# Patient Record
Sex: MALE | Race: OTHER | Employment: OTHER | URBAN - METROPOLITAN AREA
[De-identification: names, ages, dates, MRNs, and addresses within clinical notes are randomized per-mention and may not be internally consistent; named-entity substitution may affect disease eponyms.]

---

## 2017-05-20 ENCOUNTER — APPOINTMENT (OUTPATIENT)
Dept: CT IMAGING | Facility: HOSPITAL | Age: 67
DRG: 346 | End: 2017-05-20
Attending: EMERGENCY MEDICINE
Payer: MEDICAID

## 2017-05-20 ENCOUNTER — HOSPITAL ENCOUNTER (INPATIENT)
Facility: HOSPITAL | Age: 67
LOS: 3 days | Discharge: HOME | DRG: 346 | End: 2017-05-23
Attending: EMERGENCY MEDICINE | Admitting: INTERNAL MEDICINE
Payer: MEDICAID

## 2017-05-20 DIAGNOSIS — K61.1 PERIRECTAL ABSCESS: Primary | ICD-10-CM

## 2017-05-20 PROCEDURE — 96375 TX/PRO/DX INJ NEW DRUG ADDON: CPT

## 2017-05-20 PROCEDURE — 85730 THROMBOPLASTIN TIME PARTIAL: CPT | Performed by: SPECIALIST

## 2017-05-20 PROCEDURE — 85610 PROTHROMBIN TIME: CPT | Performed by: SPECIALIST

## 2017-05-20 PROCEDURE — 80048 BASIC METABOLIC PNL TOTAL CA: CPT | Performed by: EMERGENCY MEDICINE

## 2017-05-20 PROCEDURE — 85025 COMPLETE CBC W/AUTO DIFF WBC: CPT | Performed by: EMERGENCY MEDICINE

## 2017-05-20 PROCEDURE — 99285 EMERGENCY DEPT VISIT HI MDM: CPT

## 2017-05-20 PROCEDURE — 72192 CT PELVIS W/O DYE: CPT | Performed by: EMERGENCY MEDICINE

## 2017-05-20 PROCEDURE — 72193 CT PELVIS W/DYE: CPT | Performed by: EMERGENCY MEDICINE

## 2017-05-20 RX ORDER — MORPHINE SULFATE 4 MG/ML
4 INJECTION, SOLUTION INTRAMUSCULAR; INTRAVENOUS ONCE
Status: COMPLETED | OUTPATIENT
Start: 2017-05-20 | End: 2017-05-20

## 2017-05-20 RX ORDER — MORPHINE SULFATE 2 MG/ML
2 INJECTION, SOLUTION INTRAMUSCULAR; INTRAVENOUS EVERY 4 HOURS PRN
Status: DISCONTINUED | OUTPATIENT
Start: 2017-05-20 | End: 2017-05-23

## 2017-05-20 RX ORDER — METRONIDAZOLE 500 MG/100ML
500 INJECTION, SOLUTION INTRAVENOUS EVERY 8 HOURS
Status: DISCONTINUED | OUTPATIENT
Start: 2017-05-20 | End: 2017-05-23

## 2017-05-20 RX ORDER — MORPHINE SULFATE 2 MG/ML
1 INJECTION, SOLUTION INTRAMUSCULAR; INTRAVENOUS EVERY 4 HOURS PRN
Status: DISCONTINUED | OUTPATIENT
Start: 2017-05-20 | End: 2017-05-23

## 2017-05-20 RX ORDER — HYDROMORPHONE HYDROCHLORIDE 1 MG/ML
0.5 INJECTION, SOLUTION INTRAMUSCULAR; INTRAVENOUS; SUBCUTANEOUS EVERY 2 HOUR PRN
Status: ACTIVE | OUTPATIENT
Start: 2017-05-20 | End: 2017-05-20

## 2017-05-20 RX ORDER — SODIUM CHLORIDE 9 MG/ML
INJECTION, SOLUTION INTRAVENOUS CONTINUOUS
Status: DISPENSED | OUTPATIENT
Start: 2017-05-20 | End: 2017-05-20

## 2017-05-21 ENCOUNTER — ANESTHESIA (OUTPATIENT)
Dept: SURGERY | Facility: HOSPITAL | Age: 67
DRG: 346 | End: 2017-05-21
Payer: MEDICAID

## 2017-05-21 ENCOUNTER — SURGERY (OUTPATIENT)
Age: 67
End: 2017-05-21

## 2017-05-21 ENCOUNTER — ANESTHESIA EVENT (OUTPATIENT)
Dept: SURGERY | Facility: HOSPITAL | Age: 67
DRG: 346 | End: 2017-05-21
Payer: MEDICAID

## 2017-05-21 PROCEDURE — 0JD90ZZ EXTRACTION OF BUTTOCK SUBCUTANEOUS TISSUE AND FASCIA, OPEN APPROACH: ICD-10-PCS | Performed by: SPECIALIST

## 2017-05-21 PROCEDURE — 87077 CULTURE AEROBIC IDENTIFY: CPT | Performed by: SPECIALIST

## 2017-05-21 PROCEDURE — 87075 CULTR BACTERIA EXCEPT BLOOD: CPT | Performed by: SPECIALIST

## 2017-05-21 PROCEDURE — 87070 CULTURE OTHR SPECIMN AEROBIC: CPT | Performed by: SPECIALIST

## 2017-05-21 PROCEDURE — 87186 SC STD MICRODIL/AGAR DIL: CPT | Performed by: SPECIALIST

## 2017-05-21 PROCEDURE — 87076 CULTURE ANAEROBE IDENT EACH: CPT | Performed by: SPECIALIST

## 2017-05-21 PROCEDURE — 93005 ELECTROCARDIOGRAM TRACING: CPT

## 2017-05-21 PROCEDURE — 80048 BASIC METABOLIC PNL TOTAL CA: CPT | Performed by: INTERNAL MEDICINE

## 2017-05-21 PROCEDURE — 85025 COMPLETE CBC W/AUTO DIFF WBC: CPT | Performed by: INTERNAL MEDICINE

## 2017-05-21 PROCEDURE — 87205 SMEAR GRAM STAIN: CPT | Performed by: SPECIALIST

## 2017-05-21 PROCEDURE — 93010 ELECTROCARDIOGRAM REPORT: CPT | Performed by: SPECIALIST

## 2017-05-21 PROCEDURE — 85007 BL SMEAR W/DIFF WBC COUNT: CPT | Performed by: INTERNAL MEDICINE

## 2017-05-21 PROCEDURE — 85027 COMPLETE CBC AUTOMATED: CPT | Performed by: INTERNAL MEDICINE

## 2017-05-21 PROCEDURE — 0D9P0ZX DRAINAGE OF RECTUM, OPEN APPROACH, DIAGNOSTIC: ICD-10-PCS | Performed by: SPECIALIST

## 2017-05-21 RX ORDER — NEOSTIGMINE METHYLSULFATE 0.5 MG/ML
INJECTION INTRAVENOUS AS NEEDED
Status: DISCONTINUED | OUTPATIENT
Start: 2017-05-21 | End: 2017-05-21 | Stop reason: SURG

## 2017-05-21 RX ORDER — HYDROMORPHONE HYDROCHLORIDE 1 MG/ML
0.2 INJECTION, SOLUTION INTRAMUSCULAR; INTRAVENOUS; SUBCUTANEOUS EVERY 5 MIN PRN
Status: DISCONTINUED | OUTPATIENT
Start: 2017-05-21 | End: 2017-05-21 | Stop reason: HOSPADM

## 2017-05-21 RX ORDER — ENOXAPARIN SODIUM 100 MG/ML
40 INJECTION SUBCUTANEOUS DAILY
Status: DISCONTINUED | OUTPATIENT
Start: 2017-05-21 | End: 2017-05-23

## 2017-05-21 RX ORDER — HYDROCODONE BITARTRATE AND ACETAMINOPHEN 10; 325 MG/1; MG/1
1 TABLET ORAL EVERY 4 HOURS PRN
Status: DISCONTINUED | OUTPATIENT
Start: 2017-05-21 | End: 2017-05-23

## 2017-05-21 RX ORDER — SODIUM CHLORIDE 9 MG/ML
INJECTION, SOLUTION INTRAVENOUS CONTINUOUS PRN
Status: DISCONTINUED | OUTPATIENT
Start: 2017-05-21 | End: 2017-05-21 | Stop reason: SURG

## 2017-05-21 RX ORDER — HYDROCODONE BITARTRATE AND ACETAMINOPHEN 5; 325 MG/1; MG/1
2 TABLET ORAL AS NEEDED
Status: DISCONTINUED | OUTPATIENT
Start: 2017-05-21 | End: 2017-05-21 | Stop reason: HOSPADM

## 2017-05-21 RX ORDER — HYDROCODONE BITARTRATE AND ACETAMINOPHEN 10; 325 MG/1; MG/1
2 TABLET ORAL EVERY 4 HOURS PRN
Status: DISCONTINUED | OUTPATIENT
Start: 2017-05-21 | End: 2017-05-23

## 2017-05-21 RX ORDER — MORPHINE SULFATE 2 MG/ML
2 INJECTION, SOLUTION INTRAMUSCULAR; INTRAVENOUS EVERY 2 HOUR PRN
Status: DISCONTINUED | OUTPATIENT
Start: 2017-05-21 | End: 2017-05-23

## 2017-05-21 RX ORDER — SODIUM CHLORIDE 9 MG/ML
INJECTION, SOLUTION INTRAVENOUS
Status: COMPLETED
Start: 2017-05-21 | End: 2017-05-21

## 2017-05-21 RX ORDER — HALOPERIDOL 5 MG/ML
0.25 INJECTION INTRAMUSCULAR ONCE AS NEEDED
Status: DISCONTINUED | OUTPATIENT
Start: 2017-05-21 | End: 2017-05-21 | Stop reason: HOSPADM

## 2017-05-21 RX ORDER — SODIUM CHLORIDE 0.9 % (FLUSH) 0.9 %
10 SYRINGE (ML) INJECTION AS NEEDED
Status: DISCONTINUED | OUTPATIENT
Start: 2017-05-21 | End: 2017-05-23

## 2017-05-21 RX ORDER — ROCURONIUM BROMIDE 10 MG/ML
INJECTION, SOLUTION INTRAVENOUS AS NEEDED
Status: DISCONTINUED | OUTPATIENT
Start: 2017-05-21 | End: 2017-05-21 | Stop reason: SURG

## 2017-05-21 RX ORDER — HYDROCODONE BITARTRATE AND ACETAMINOPHEN 5; 325 MG/1; MG/1
1 TABLET ORAL AS NEEDED
Status: DISCONTINUED | OUTPATIENT
Start: 2017-05-21 | End: 2017-05-21 | Stop reason: HOSPADM

## 2017-05-21 RX ORDER — GLYCOPYRROLATE 0.2 MG/ML
INJECTION INTRAMUSCULAR; INTRAVENOUS AS NEEDED
Status: DISCONTINUED | OUTPATIENT
Start: 2017-05-21 | End: 2017-05-21 | Stop reason: SURG

## 2017-05-21 RX ORDER — MORPHINE SULFATE 4 MG/ML
4 INJECTION, SOLUTION INTRAMUSCULAR; INTRAVENOUS EVERY 2 HOUR PRN
Status: DISCONTINUED | OUTPATIENT
Start: 2017-05-21 | End: 2017-05-23

## 2017-05-21 RX ORDER — PHENYLEPHRINE HCL 10 MG/ML
VIAL (ML) INJECTION AS NEEDED
Status: DISCONTINUED | OUTPATIENT
Start: 2017-05-21 | End: 2017-05-21 | Stop reason: SURG

## 2017-05-21 RX ORDER — ONDANSETRON 2 MG/ML
4 INJECTION INTRAMUSCULAR; INTRAVENOUS ONCE AS NEEDED
Status: DISCONTINUED | OUTPATIENT
Start: 2017-05-21 | End: 2017-05-21 | Stop reason: HOSPADM

## 2017-05-21 RX ORDER — SODIUM CHLORIDE, SODIUM LACTATE, POTASSIUM CHLORIDE, CALCIUM CHLORIDE 600; 310; 30; 20 MG/100ML; MG/100ML; MG/100ML; MG/100ML
INJECTION, SOLUTION INTRAVENOUS CONTINUOUS
Status: DISCONTINUED | OUTPATIENT
Start: 2017-05-21 | End: 2017-05-21

## 2017-05-21 RX ORDER — MORPHINE SULFATE 2 MG/ML
1 INJECTION, SOLUTION INTRAMUSCULAR; INTRAVENOUS EVERY 2 HOUR PRN
Status: DISCONTINUED | OUTPATIENT
Start: 2017-05-21 | End: 2017-05-23

## 2017-05-21 RX ORDER — ACETAMINOPHEN 325 MG/1
650 TABLET ORAL EVERY 4 HOURS PRN
Status: DISCONTINUED | OUTPATIENT
Start: 2017-05-21 | End: 2017-05-23

## 2017-05-21 RX ADMIN — SODIUM CHLORIDE: 9 INJECTION, SOLUTION INTRAVENOUS at 14:55:00

## 2017-05-21 RX ADMIN — NEOSTIGMINE METHYLSULFATE 1 MG: 0.5 INJECTION INTRAVENOUS at 14:27:00

## 2017-05-21 RX ADMIN — ROCURONIUM BROMIDE 30 MG: 10 INJECTION, SOLUTION INTRAVENOUS at 13:50:00

## 2017-05-21 RX ADMIN — PHENYLEPHRINE HCL 300 MCG: 10 MG/ML VIAL (ML) INJECTION at 14:24:00

## 2017-05-21 RX ADMIN — GLYCOPYRROLATE 0.2 MG: 0.2 INJECTION INTRAMUSCULAR; INTRAVENOUS at 14:27:00

## 2017-05-21 RX ADMIN — SODIUM CHLORIDE: 9 INJECTION, SOLUTION INTRAVENOUS at 14:25:00

## 2017-05-21 RX ADMIN — NEOSTIGMINE METHYLSULFATE 1 MG: 0.5 INJECTION INTRAVENOUS at 14:44:00

## 2017-05-21 RX ADMIN — SODIUM CHLORIDE: 9 INJECTION, SOLUTION INTRAVENOUS at 13:30:00

## 2017-05-21 RX ADMIN — SODIUM CHLORIDE: 9 INJECTION, SOLUTION INTRAVENOUS at 13:34:00

## 2017-05-21 RX ADMIN — GLYCOPYRROLATE 0.2 MG: 0.2 INJECTION INTRAMUSCULAR; INTRAVENOUS at 14:44:00

## 2017-05-21 NOTE — PROGRESS NOTES
[facility]    Progress Note    Ramandeep Cobian Patient Status:  Inpatient    1950 MRN [id number]   Location [facility] 4W/SW/SE Attending Armond Francisco MD   Hosp Day # 1 PCP None Pcp     Subjective:     Constitutional:

## 2017-05-21 NOTE — ANESTHESIA PREPROCEDURE EVALUATION
Anesthesia PreOp Note    HPI:     Lizzeth Young is a 78year old male who presents for preoperative consultation requested by: Anthony Bowers MD    Date of Surgery: 5/20/2017 - 5/21/2017    Procedure(s):  ANAL ABSCESS IRRIGATION & DEBRIDEMENT  Miesha Delgado Intravenous PRN Anh Helm MD 1 mg at 05/21/17 1444    glycopyrrolate (ROBINUL) 0.2 MG/ML injection  Intravenous PRN Anh Helm MD 0.2 mg at 05/21/17 1444    Rocuronium Bromide (ZEMURON) injection  Intravenous PRN Anh Helm MD 30 mg a Lab Results  Component Value Date   INR 1.3* 05/20/2017       Vital Signs: Body mass index is 28.33 kg/(m^2). height is 1.727 m (5' 7.99\") and weight is 84.505 kg (186 lb 4.8 oz). His oral temperature is 97 °F (36.1 °C).  His blood pressure is 91/

## 2017-05-21 NOTE — PLAN OF CARE
Consent obtained over the phone using the translation line. Kings Nascimento #[id number] assisted with the signature. Daughter Bladimir Cheatham also called and assisted patient with signing the consent. Plan for I&D of perirectal abscess today with Dr. Carlie Whaley. Patient remains NPO.

## 2017-05-21 NOTE — H&P
[facility]    History and Physical    [facility] Patient Status:  Inpatient    1950 MRN [id number]   Location [facility] 4W/SW/SE Attending Cady Fox MD   [facility] Day # 0 PCP None Pcp     Date:  2017  Date of Results  Component Value Date   WBC 18.5* 05/20/2017   HGB 14.1 05/20/2017   HCT 43.3 05/20/2017    05/20/2017   CREATSERUM 0.66 05/20/2017   BUN 2* 05/20/2017   * 05/20/2017   K 3.3 05/20/2017    05/20/2017   CO2 22 05/20/2017

## 2017-05-21 NOTE — PLAN OF CARE
Altered Communication/Language Barrier    • Patient/Family is able to understand and participate in their care   Progressing        DISCHARGE PLANNING    • Discharge to home or other facility with appropriate resources Progressing        PAIN - ADULT    •

## 2017-05-21 NOTE — CONSULTS
[facility] HOSP - [facility]    Report of Consultation    Lizzeth Hector Patient Status:  Inpatient    1950 MRN [id number]   Location [place] [address] Attending Leonor Lewis MD   [facility] Day # 1 PCP None Pcp     Date of Admissio examination is grossly normal.  No focal neurologic deficit.     Laboratory Data:    Lab Results  Component Value Date   WBC 18.1* 05/21/2017   HGB 14.4 05/21/2017   HCT 43.9 05/21/2017    05/21/2017   CREATSERUM 0.72 05/21/2017   BUN 4* 05/21/2017 Moderate-sized perirectal abscess as discussed above. See above discussion. Surgical intervention is advised.            Impression and Plan:  Patient Active Problem List:     Perirectal abscess  for I and D  And debridement     Doubt involving rectum

## 2017-05-21 NOTE — CONSULTS
[facility]    Report of Consultation    Edy Javed Patient Status:  Inpatient    1950 MRN [id number]   Location [facility] 4W/SW/SE Attending Ignacia Gaston MD   [address] Day # 1 PCP None Pcp     Date of Admission:    05/21/2017   CREATSERUM 0.72 05/21/2017   BUN 4* 05/21/2017    05/21/2017   K 3.9 05/21/2017    05/21/2017   CO2 27 05/21/2017   * 05/21/2017   CA 8.2* 05/21/2017   PTT 27.7 05/20/2017   INR 1.3* 05/20/2017   PTP 15.6* 05/20/20

## 2017-05-21 NOTE — BRIEF OP NOTE
Pre-Operative Diagnosis: Perirectal abscess [K61.1]     Post-Operative Diagnosis: same     Procedure Performed:   Procedure(s):  INCISION AND DRAINAGE AND DEBRIDEMENT OF PERIRECTAL ABSCESS    Surgeon(s) and Role:     Sally Wilson MD - Primary

## 2017-05-21 NOTE — ED PROVIDER NOTES
Patient Seen in: [facility] AND [facility] Emergency Department    History   Patient presents with:  Abscess (integumentary)    Stated Complaint: c/o left buttock abscess    HPI    The patient is a 39-year-old male who presents with 4 days of gradually increasin Abdominal: Soft. Bowel sounds are normal. He exhibits no distension and no mass. There is no tenderness. There is no rebound and no guarding.    Genitourinary: Rectal exam shows tenderness (Point tenderness without palpable fluctuance mass or overlying warm Radiology exams  Viewed and reviewed by myself and findings discussed with patient including need for follow up    Case discussed with Dr. Roxana García and Dr Kiana Garcia with Id dr Willie Arcos on consult.   Iv zosyn given and patient will be admitted for IV antibiotics

## 2017-05-21 NOTE — ANESTHESIA POSTPROCEDURE EVALUATION
Patient: Екатерина [place]    Procedure Summary     Date Anesthesia Start Anesthesia Stop Room / Location    05/21/17 1335  [facility] MAIN OR 05 / 300 [facility] MAIN OR       Procedure Diagnosis Surgeon Responsible Provider    ANAL ABSCESS IRRIGATION & DEBRIDEMENT (N/A ) Pe

## 2017-05-22 PROCEDURE — 85025 COMPLETE CBC W/AUTO DIFF WBC: CPT | Performed by: SPECIALIST

## 2017-05-22 NOTE — PROGRESS NOTES
INFECTIOUS DISEASE PROGRESS NOTE    Doc Kan Patient Status:  Inpatient    1950 MRN [id number]   Location [facility] 4W/SW/SE Attending Analy Carrera MD   Hosp Day # 2 PCP None Pcp     Subjective:  Patient seen and examined, note

## 2017-05-22 NOTE — OPERATIVE REPORT
[facility]    PATIENT'S NAME: Regine Mccann   ATTENDING PHYSICIAN: Kathleen Man MD   OPERATING PHYSICIAN: Gosia Hood MD   PATIENT ACCOUNT#:   [id number]    LOCATION:  [address] [place] [address] #:   [id number]       DATE OF BERNADINE

## 2017-05-22 NOTE — PROGRESS NOTES
[facility]    Progress Note    Angel Martin Patient Status:  Inpatient    1950 MRN [id number]   Location [facility] 4W/SW/SE Attending Padma Shabazz MD   [facility] Day # 2 PCP None Pcp     Subjective:  Feels ok      Less located posterior and inferior to the rectum with fluid density and peripheral enhancement as well as some air density compatible with a moderate-sized perirectal abscess. This measures 2.9 x 4.6 x 5.5 cm in size.  Air within the abscess may suggest either

## 2017-05-22 NOTE — PROGRESS NOTES
[facility]    Progress Note    Eb Mathew Patient Status:  Inpatient    1950 MRN [id number]   Location [facility] 4W/SW/SE Attending Stephane Petty MD   Hosp Day # 2 PCP None Pcp     Subjective:     Constitutional:

## 2017-05-23 VITALS
DIASTOLIC BLOOD PRESSURE: 67 MMHG | SYSTOLIC BLOOD PRESSURE: 114 MMHG | HEIGHT: 67.99 IN | WEIGHT: 186.31 LBS | RESPIRATION RATE: 20 BRPM | BODY MASS INDEX: 28.24 KG/M2 | HEART RATE: 100 BPM | TEMPERATURE: 99 F | OXYGEN SATURATION: 97 %

## 2017-05-23 RX ORDER — AMOXICILLIN AND CLAVULANATE POTASSIUM 875; 125 MG/1; MG/1
1 TABLET, FILM COATED ORAL 2 TIMES DAILY
Qty: 20 TABLET | Refills: 0 | Status: ON HOLD | OUTPATIENT
Start: 2017-05-23 | End: 2018-01-01

## 2017-05-23 RX ORDER — 0.9 % SODIUM CHLORIDE 0.9 %
VIAL (ML) INJECTION
Status: COMPLETED
Start: 2017-05-23 | End: 2017-05-23

## 2017-05-23 RX ORDER — SODIUM CHLORIDE 9 MG/ML
INJECTION, SOLUTION INTRAVENOUS
Status: COMPLETED
Start: 2017-05-23 | End: 2017-05-23

## 2017-05-23 RX ORDER — CIPROFLOXACIN 500 MG/1
500 TABLET, FILM COATED ORAL 2 TIMES DAILY
Qty: 20 TABLET | Refills: 0 | Status: ON HOLD | OUTPATIENT
Start: 2017-05-23 | End: 2018-01-01

## 2017-05-23 NOTE — PROGRESS NOTES
INFECTIOUS DISEASE PROGRESS NOTE    Zuri Sullivan Patient Status:  Inpatient    1950 MRN [id number]   Location [facility] 4W/SW/SE Attending Elvia Nunn MD   Hosp Day # 3 PCP None Pcp     Subjective:  Patient seen and examined, note

## 2017-05-23 NOTE — DISCHARGE PLANNING
ELOISE was notified the pt. Will be needing IV abx at discharge. [facility] is covered 100% for at home delivery. Referral has been made to [facility] to see if they are able to staff the nursing services. Waiting on return call.       4pm- ELOISE was notified t

## 2017-05-23 NOTE — PLAN OF CARE
Altered Communication/Language Barrier    • Patient/Family is able to understand and participate in their care Adequate for Discharge        DISCHARGE PLANNING    • Discharge to home or other facility with appropriate resources Adequate for Discharge

## 2017-05-23 NOTE — PROGRESS NOTES
[facility]    Progress Note    Lizzeth Young Patient Status:  Inpatient    1950 MRN [id number]   Location [facility] 4W/SW/SE Attending Leonor Lewis MD   Hosp Day # 3 PCP None Pcp     Subjective:  Feels ok    Object peripheral enhancement as well as some air density compatible with a moderate-sized perirectal abscess. This measures 2.9 x 4.6 x 5.5 cm in size.  Air within the abscess may suggest either direct communication with the rectum or be secondary to gas-forming

## 2017-05-23 NOTE — DISCHARGE SUMMARY
[facility] - [facility]    Discharge Summary    Nirali De Guzman Patient Status:  Inpatient    1950 MRN [id number]   Location [facility] 4W/SW/SE Attending Malinda Carey MD   [facility] Day # 3 PCP None Pcp     Date of Admission: 2017

## 2017-06-07 ENCOUNTER — LAB ENCOUNTER (OUTPATIENT)
Dept: LAB | Facility: HOSPITAL | Age: 67
End: 2017-06-07
Attending: INTERNAL MEDICINE
Payer: MEDICAID

## 2017-06-07 DIAGNOSIS — Z00.00 WELL ADULT EXAM: ICD-10-CM

## 2017-06-07 DIAGNOSIS — E78.5 HYPERLIPIDEMIA: Primary | ICD-10-CM

## 2017-06-07 PROCEDURE — 84443 ASSAY THYROID STIM HORMONE: CPT

## 2017-06-07 PROCEDURE — 80053 COMPREHEN METABOLIC PANEL: CPT

## 2017-06-07 PROCEDURE — 85025 COMPLETE CBC W/AUTO DIFF WBC: CPT

## 2017-06-07 PROCEDURE — 84153 ASSAY OF PSA TOTAL: CPT

## 2017-06-07 PROCEDURE — 80061 LIPID PANEL: CPT

## 2017-06-07 PROCEDURE — 36415 COLL VENOUS BLD VENIPUNCTURE: CPT

## 2018-01-01 ENCOUNTER — NURSE ONLY (OUTPATIENT)
Dept: HEMATOLOGY/ONCOLOGY | Facility: HOSPITAL | Age: 68
End: 2018-01-01
Attending: INTERNAL MEDICINE
Payer: MEDICAID

## 2018-01-01 ENCOUNTER — TELEPHONE (OUTPATIENT)
Dept: HEMATOLOGY/ONCOLOGY | Facility: HOSPITAL | Age: 68
End: 2018-01-01

## 2018-01-01 ENCOUNTER — TELEPHONE (OUTPATIENT)
Dept: GASTROENTEROLOGY | Facility: CLINIC | Age: 68
End: 2018-01-01

## 2018-01-01 ENCOUNTER — SURGERY (OUTPATIENT)
Age: 68
End: 2018-01-01

## 2018-01-01 ENCOUNTER — OFFICE VISIT (OUTPATIENT)
Dept: HEMATOLOGY/ONCOLOGY | Facility: HOSPITAL | Age: 68
End: 2018-01-01
Attending: INTERNAL MEDICINE
Payer: MEDICARE

## 2018-01-01 ENCOUNTER — APPOINTMENT (OUTPATIENT)
Dept: ULTRASOUND IMAGING | Facility: HOSPITAL | Age: 68
DRG: 981 | End: 2018-01-01
Attending: HOSPITALIST
Payer: MEDICAID

## 2018-01-01 ENCOUNTER — HOSPITAL ENCOUNTER (OUTPATIENT)
Dept: ULTRASOUND IMAGING | Facility: HOSPITAL | Age: 68
Discharge: HOME | End: 2018-01-01
Attending: INTERNAL MEDICINE
Payer: MEDICAID

## 2018-01-01 ENCOUNTER — HOSPITAL ENCOUNTER (OUTPATIENT)
Dept: CT IMAGING | Age: 68
Discharge: HOME | End: 2018-01-01
Attending: INTERNAL MEDICINE
Payer: MEDICAID

## 2018-01-01 ENCOUNTER — HOSPITAL ENCOUNTER (OUTPATIENT)
Dept: CT IMAGING | Facility: HOSPITAL | Age: 68
Discharge: HOME | End: 2018-01-01
Attending: PHYSICIAN ASSISTANT
Payer: MEDICAID

## 2018-01-01 ENCOUNTER — HOSPITAL ENCOUNTER (OUTPATIENT)
Dept: ULTRASOUND IMAGING | Facility: HOSPITAL | Age: 68
Discharge: HOME | End: 2018-01-01
Attending: PHYSICIAN ASSISTANT
Payer: MEDICAID

## 2018-01-01 ENCOUNTER — APPOINTMENT (OUTPATIENT)
Dept: MRI IMAGING | Facility: HOSPITAL | Age: 68
DRG: 981 | End: 2018-01-01
Attending: HOSPITALIST
Payer: MEDICAID

## 2018-01-01 ENCOUNTER — HOSPITAL ENCOUNTER (OUTPATIENT)
Dept: ULTRASOUND IMAGING | Age: 68
Discharge: HOME | End: 2018-01-01
Attending: FAMILY MEDICINE
Payer: MEDICAID

## 2018-01-01 ENCOUNTER — APPOINTMENT (OUTPATIENT)
Dept: MRI IMAGING | Facility: HOSPITAL | Age: 68
DRG: 981 | End: 2018-01-01
Attending: SPECIALIST
Payer: MEDICAID

## 2018-01-01 ENCOUNTER — OFFICE VISIT (OUTPATIENT)
Dept: GASTROENTEROLOGY | Facility: CLINIC | Age: 68
End: 2018-01-01

## 2018-01-01 ENCOUNTER — OFFICE VISIT (OUTPATIENT)
Dept: HEMATOLOGY/ONCOLOGY | Facility: HOSPITAL | Age: 68
End: 2018-01-01
Attending: PHYSICIAN ASSISTANT
Payer: MEDICAID

## 2018-01-01 ENCOUNTER — HOSPITAL ENCOUNTER (INPATIENT)
Facility: HOSPITAL | Age: 68
LOS: 4 days | Discharge: HOME | DRG: 981 | End: 2018-01-01
Attending: EMERGENCY MEDICINE | Admitting: HOSPITALIST
Payer: MEDICAID

## 2018-01-01 ENCOUNTER — HOSPITAL ENCOUNTER (OUTPATIENT)
Dept: CT IMAGING | Facility: HOSPITAL | Age: 68
Discharge: HOME | End: 2018-01-01
Attending: INTERNAL MEDICINE
Payer: MEDICAID

## 2018-01-01 ENCOUNTER — OFFICE VISIT (OUTPATIENT)
Dept: SURGERY | Facility: CLINIC | Age: 68
End: 2018-01-01
Payer: MEDICAID

## 2018-01-01 ENCOUNTER — HOSPITAL ENCOUNTER (OUTPATIENT)
Age: 68
Discharge: HOME | End: 2018-01-01
Attending: FAMILY MEDICINE
Payer: MEDICAID

## 2018-01-01 ENCOUNTER — OFFICE VISIT (OUTPATIENT)
Dept: HEMATOLOGY/ONCOLOGY | Facility: HOSPITAL | Age: 68
End: 2018-01-01
Attending: NURSE PRACTITIONER
Payer: MEDICAID

## 2018-01-01 ENCOUNTER — LAB ENCOUNTER (OUTPATIENT)
Dept: LAB | Facility: HOSPITAL | Age: 68
End: 2018-01-01
Attending: PHYSICIAN ASSISTANT
Payer: MEDICAID

## 2018-01-01 ENCOUNTER — HOSPITAL ENCOUNTER (EMERGENCY)
Facility: HOSPITAL | Age: 68
Discharge: HOME | End: 2018-01-01
Attending: EMERGENCY MEDICINE
Payer: MEDICAID

## 2018-01-01 VITALS
TEMPERATURE: 98 F | WEIGHT: 158 LBS | SYSTOLIC BLOOD PRESSURE: 118 MMHG | DIASTOLIC BLOOD PRESSURE: 62 MMHG | RESPIRATION RATE: 16 BRPM | BODY MASS INDEX: 24 KG/M2 | HEART RATE: 103 BPM

## 2018-01-01 VITALS
SYSTOLIC BLOOD PRESSURE: 126 MMHG | HEART RATE: 105 BPM | RESPIRATION RATE: 16 BRPM | TEMPERATURE: 99 F | DIASTOLIC BLOOD PRESSURE: 60 MMHG

## 2018-01-01 VITALS
DIASTOLIC BLOOD PRESSURE: 104 MMHG | RESPIRATION RATE: 18 BRPM | HEART RATE: 112 BPM | WEIGHT: 166 LBS | BODY MASS INDEX: 25.16 KG/M2 | TEMPERATURE: 98 F | HEIGHT: 68 IN | SYSTOLIC BLOOD PRESSURE: 146 MMHG

## 2018-01-01 VITALS
BODY MASS INDEX: 26 KG/M2 | SYSTOLIC BLOOD PRESSURE: 139 MMHG | RESPIRATION RATE: 18 BRPM | DIASTOLIC BLOOD PRESSURE: 90 MMHG | TEMPERATURE: 98 F | HEART RATE: 113 BPM | WEIGHT: 170 LBS

## 2018-01-01 VITALS
HEART RATE: 94 BPM | HEIGHT: 68 IN | DIASTOLIC BLOOD PRESSURE: 63 MMHG | WEIGHT: 161 LBS | BODY MASS INDEX: 24.4 KG/M2 | TEMPERATURE: 98 F | SYSTOLIC BLOOD PRESSURE: 118 MMHG | RESPIRATION RATE: 18 BRPM

## 2018-01-01 VITALS
HEART RATE: 96 BPM | DIASTOLIC BLOOD PRESSURE: 70 MMHG | BODY MASS INDEX: 24.25 KG/M2 | TEMPERATURE: 99 F | SYSTOLIC BLOOD PRESSURE: 130 MMHG | WEIGHT: 160 LBS | HEIGHT: 68 IN | RESPIRATION RATE: 18 BRPM

## 2018-01-01 VITALS
SYSTOLIC BLOOD PRESSURE: 128 MMHG | DIASTOLIC BLOOD PRESSURE: 79 MMHG | HEART RATE: 92 BPM | RESPIRATION RATE: 16 BRPM | TEMPERATURE: 98 F

## 2018-01-01 VITALS
RESPIRATION RATE: 16 BRPM | SYSTOLIC BLOOD PRESSURE: 132 MMHG | HEIGHT: 68 IN | BODY MASS INDEX: 25.46 KG/M2 | TEMPERATURE: 98 F | WEIGHT: 168 LBS | DIASTOLIC BLOOD PRESSURE: 74 MMHG | HEART RATE: 99 BPM

## 2018-01-01 VITALS
RESPIRATION RATE: 16 BRPM | BODY MASS INDEX: 25.91 KG/M2 | TEMPERATURE: 98 F | WEIGHT: 171 LBS | SYSTOLIC BLOOD PRESSURE: 150 MMHG | HEART RATE: 109 BPM | DIASTOLIC BLOOD PRESSURE: 67 MMHG | HEIGHT: 68 IN

## 2018-01-01 VITALS
BODY MASS INDEX: 23.64 KG/M2 | DIASTOLIC BLOOD PRESSURE: 71 MMHG | TEMPERATURE: 98 F | HEART RATE: 89 BPM | SYSTOLIC BLOOD PRESSURE: 121 MMHG | WEIGHT: 156 LBS | HEIGHT: 68 IN | RESPIRATION RATE: 18 BRPM

## 2018-01-01 VITALS
HEART RATE: 99 BPM | HEIGHT: 68 IN | SYSTOLIC BLOOD PRESSURE: 121 MMHG | OXYGEN SATURATION: 100 % | WEIGHT: 166 LBS | DIASTOLIC BLOOD PRESSURE: 64 MMHG | RESPIRATION RATE: 16 BRPM | BODY MASS INDEX: 25.16 KG/M2

## 2018-01-01 VITALS
DIASTOLIC BLOOD PRESSURE: 63 MMHG | TEMPERATURE: 100 F | OXYGEN SATURATION: 99 % | BODY MASS INDEX: 23.79 KG/M2 | SYSTOLIC BLOOD PRESSURE: 124 MMHG | WEIGHT: 157 LBS | HEIGHT: 68 IN | RESPIRATION RATE: 18 BRPM | DIASTOLIC BLOOD PRESSURE: 80 MMHG | HEART RATE: 102 BPM | BODY MASS INDEX: 24 KG/M2 | TEMPERATURE: 98 F | WEIGHT: 161 LBS | RESPIRATION RATE: 18 BRPM | HEART RATE: 94 BPM | SYSTOLIC BLOOD PRESSURE: 118 MMHG

## 2018-01-01 VITALS
HEART RATE: 86 BPM | SYSTOLIC BLOOD PRESSURE: 129 MMHG | WEIGHT: 170 LBS | BODY MASS INDEX: 25.76 KG/M2 | DIASTOLIC BLOOD PRESSURE: 64 MMHG | RESPIRATION RATE: 16 BRPM | OXYGEN SATURATION: 99 % | HEIGHT: 68 IN

## 2018-01-01 VITALS
HEART RATE: 80 BPM | RESPIRATION RATE: 16 BRPM | DIASTOLIC BLOOD PRESSURE: 78 MMHG | SYSTOLIC BLOOD PRESSURE: 133 MMHG | HEIGHT: 68 IN | OXYGEN SATURATION: 100 % | WEIGHT: 166 LBS | TEMPERATURE: 98 F | BODY MASS INDEX: 25.16 KG/M2

## 2018-01-01 VITALS
HEART RATE: 97 BPM | RESPIRATION RATE: 16 BRPM | SYSTOLIC BLOOD PRESSURE: 115 MMHG | TEMPERATURE: 98 F | BODY MASS INDEX: 24 KG/M2 | DIASTOLIC BLOOD PRESSURE: 68 MMHG | WEIGHT: 160 LBS

## 2018-01-01 VITALS
RESPIRATION RATE: 16 BRPM | WEIGHT: 158 LBS | TEMPERATURE: 98 F | BODY MASS INDEX: 23.95 KG/M2 | HEART RATE: 103 BPM | SYSTOLIC BLOOD PRESSURE: 118 MMHG | DIASTOLIC BLOOD PRESSURE: 62 MMHG | HEIGHT: 68 IN

## 2018-01-01 VITALS
RESPIRATION RATE: 18 BRPM | BODY MASS INDEX: 25.16 KG/M2 | SYSTOLIC BLOOD PRESSURE: 147 MMHG | HEART RATE: 119 BPM | DIASTOLIC BLOOD PRESSURE: 79 MMHG | TEMPERATURE: 99 F | WEIGHT: 166 LBS | HEIGHT: 68 IN

## 2018-01-01 VITALS
TEMPERATURE: 98 F | SYSTOLIC BLOOD PRESSURE: 128 MMHG | RESPIRATION RATE: 16 BRPM | HEART RATE: 92 BPM | DIASTOLIC BLOOD PRESSURE: 79 MMHG

## 2018-01-01 VITALS
HEIGHT: 68 IN | BODY MASS INDEX: 24.25 KG/M2 | TEMPERATURE: 98 F | WEIGHT: 160 LBS | SYSTOLIC BLOOD PRESSURE: 115 MMHG | DIASTOLIC BLOOD PRESSURE: 68 MMHG | HEART RATE: 97 BPM | RESPIRATION RATE: 16 BRPM

## 2018-01-01 VITALS
DIASTOLIC BLOOD PRESSURE: 104 MMHG | TEMPERATURE: 98 F | WEIGHT: 166 LBS | SYSTOLIC BLOOD PRESSURE: 146 MMHG | HEIGHT: 68 IN | HEART RATE: 112 BPM | RESPIRATION RATE: 18 BRPM | BODY MASS INDEX: 25.16 KG/M2

## 2018-01-01 VITALS
SYSTOLIC BLOOD PRESSURE: 128 MMHG | BODY MASS INDEX: 26.09 KG/M2 | WEIGHT: 172.13 LBS | HEART RATE: 95 BPM | HEIGHT: 68 IN | DIASTOLIC BLOOD PRESSURE: 73 MMHG | TEMPERATURE: 98 F | OXYGEN SATURATION: 97 % | RESPIRATION RATE: 16 BRPM

## 2018-01-01 VITALS
DIASTOLIC BLOOD PRESSURE: 74 MMHG | RESPIRATION RATE: 16 BRPM | BODY MASS INDEX: 25.49 KG/M2 | TEMPERATURE: 98 F | HEART RATE: 99 BPM | WEIGHT: 168.19 LBS | HEIGHT: 68 IN | SYSTOLIC BLOOD PRESSURE: 132 MMHG

## 2018-01-01 VITALS
HEART RATE: 82 BPM | WEIGHT: 159 LBS | TEMPERATURE: 99 F | SYSTOLIC BLOOD PRESSURE: 123 MMHG | HEIGHT: 68 IN | DIASTOLIC BLOOD PRESSURE: 69 MMHG | RESPIRATION RATE: 18 BRPM | BODY MASS INDEX: 24.1 KG/M2

## 2018-01-01 VITALS
HEART RATE: 82 BPM | TEMPERATURE: 99 F | WEIGHT: 159 LBS | HEIGHT: 68 IN | SYSTOLIC BLOOD PRESSURE: 123 MMHG | RESPIRATION RATE: 18 BRPM | BODY MASS INDEX: 24.1 KG/M2 | DIASTOLIC BLOOD PRESSURE: 69 MMHG

## 2018-01-01 VITALS
WEIGHT: 179.25 LBS | HEART RATE: 107 BPM | BODY MASS INDEX: 27.17 KG/M2 | SYSTOLIC BLOOD PRESSURE: 144 MMHG | DIASTOLIC BLOOD PRESSURE: 84 MMHG | HEIGHT: 68 IN | TEMPERATURE: 98 F | RESPIRATION RATE: 18 BRPM

## 2018-01-01 VITALS
WEIGHT: 158 LBS | HEIGHT: 68 IN | BODY MASS INDEX: 23.95 KG/M2 | SYSTOLIC BLOOD PRESSURE: 122 MMHG | DIASTOLIC BLOOD PRESSURE: 77 MMHG | HEART RATE: 117 BPM | TEMPERATURE: 98 F | RESPIRATION RATE: 16 BRPM

## 2018-01-01 VITALS
TEMPERATURE: 99 F | RESPIRATION RATE: 16 BRPM | HEART RATE: 92 BPM | BODY MASS INDEX: 23.64 KG/M2 | SYSTOLIC BLOOD PRESSURE: 114 MMHG | WEIGHT: 156 LBS | HEIGHT: 68 IN | DIASTOLIC BLOOD PRESSURE: 75 MMHG

## 2018-01-01 VITALS
SYSTOLIC BLOOD PRESSURE: 136 MMHG | WEIGHT: 165 LBS | HEART RATE: 102 BPM | DIASTOLIC BLOOD PRESSURE: 81 MMHG | HEIGHT: 68 IN | BODY MASS INDEX: 25.01 KG/M2 | RESPIRATION RATE: 16 BRPM

## 2018-01-01 VITALS
RESPIRATION RATE: 18 BRPM | DIASTOLIC BLOOD PRESSURE: 90 MMHG | TEMPERATURE: 98 F | HEIGHT: 68 IN | HEART RATE: 113 BPM | BODY MASS INDEX: 25.76 KG/M2 | WEIGHT: 170 LBS | SYSTOLIC BLOOD PRESSURE: 139 MMHG

## 2018-01-01 VITALS
HEART RATE: 107 BPM | TEMPERATURE: 98 F | RESPIRATION RATE: 18 BRPM | BODY MASS INDEX: 27 KG/M2 | WEIGHT: 179 LBS | DIASTOLIC BLOOD PRESSURE: 84 MMHG | SYSTOLIC BLOOD PRESSURE: 144 MMHG

## 2018-01-01 VITALS
DIASTOLIC BLOOD PRESSURE: 72 MMHG | OXYGEN SATURATION: 96 % | RESPIRATION RATE: 16 BRPM | TEMPERATURE: 98 F | SYSTOLIC BLOOD PRESSURE: 129 MMHG | HEART RATE: 92 BPM

## 2018-01-01 VITALS
TEMPERATURE: 98 F | DIASTOLIC BLOOD PRESSURE: 68 MMHG | HEART RATE: 102 BPM | SYSTOLIC BLOOD PRESSURE: 116 MMHG | RESPIRATION RATE: 16 BRPM

## 2018-01-01 VITALS
HEART RATE: 82 BPM | WEIGHT: 166 LBS | RESPIRATION RATE: 16 BRPM | TEMPERATURE: 98 F | HEIGHT: 68 IN | BODY MASS INDEX: 25.16 KG/M2 | SYSTOLIC BLOOD PRESSURE: 117 MMHG | DIASTOLIC BLOOD PRESSURE: 101 MMHG

## 2018-01-01 VITALS
TEMPERATURE: 99 F | RESPIRATION RATE: 16 BRPM | SYSTOLIC BLOOD PRESSURE: 121 MMHG | DIASTOLIC BLOOD PRESSURE: 77 MMHG | HEART RATE: 95 BPM

## 2018-01-01 VITALS
RESPIRATION RATE: 18 BRPM | SYSTOLIC BLOOD PRESSURE: 129 MMHG | DIASTOLIC BLOOD PRESSURE: 80 MMHG | HEIGHT: 68 IN | HEART RATE: 107 BPM | BODY MASS INDEX: 23.79 KG/M2 | TEMPERATURE: 98 F | WEIGHT: 157 LBS

## 2018-01-01 VITALS
BODY MASS INDEX: 25.46 KG/M2 | WEIGHT: 168 LBS | RESPIRATION RATE: 18 BRPM | HEART RATE: 91 BPM | TEMPERATURE: 98 F | DIASTOLIC BLOOD PRESSURE: 72 MMHG | SYSTOLIC BLOOD PRESSURE: 122 MMHG | HEIGHT: 68 IN

## 2018-01-01 VITALS
SYSTOLIC BLOOD PRESSURE: 130 MMHG | TEMPERATURE: 98 F | HEART RATE: 107 BPM | DIASTOLIC BLOOD PRESSURE: 75 MMHG | RESPIRATION RATE: 18 BRPM

## 2018-01-01 VITALS
RESPIRATION RATE: 18 BRPM | BODY MASS INDEX: 23.64 KG/M2 | SYSTOLIC BLOOD PRESSURE: 135 MMHG | DIASTOLIC BLOOD PRESSURE: 83 MMHG | TEMPERATURE: 98 F | HEART RATE: 106 BPM | HEIGHT: 68 IN | WEIGHT: 156 LBS

## 2018-01-01 VITALS
TEMPERATURE: 98 F | DIASTOLIC BLOOD PRESSURE: 77 MMHG | RESPIRATION RATE: 16 BRPM | SYSTOLIC BLOOD PRESSURE: 122 MMHG | HEART RATE: 117 BPM

## 2018-01-01 VITALS
RESPIRATION RATE: 20 BRPM | DIASTOLIC BLOOD PRESSURE: 74 MMHG | OXYGEN SATURATION: 100 % | HEART RATE: 94 BPM | SYSTOLIC BLOOD PRESSURE: 127 MMHG

## 2018-01-01 VITALS
RESPIRATION RATE: 16 BRPM | SYSTOLIC BLOOD PRESSURE: 133 MMHG | HEART RATE: 115 BPM | TEMPERATURE: 98 F | DIASTOLIC BLOOD PRESSURE: 74 MMHG

## 2018-01-01 VITALS
SYSTOLIC BLOOD PRESSURE: 141 MMHG | DIASTOLIC BLOOD PRESSURE: 92 MMHG | BODY MASS INDEX: 26.22 KG/M2 | HEIGHT: 68 IN | WEIGHT: 173 LBS | HEART RATE: 58 BPM

## 2018-01-01 DIAGNOSIS — R79.89 ELEVATED LFTS: ICD-10-CM

## 2018-01-01 DIAGNOSIS — C22.0 HEPATOCELLULAR CARCINOMA (HCC): Primary | ICD-10-CM

## 2018-01-01 DIAGNOSIS — Z51.11 CHEMOTHERAPY MANAGEMENT, ENCOUNTER FOR: ICD-10-CM

## 2018-01-01 DIAGNOSIS — R35.0 URINARY FREQUENCY: ICD-10-CM

## 2018-01-01 DIAGNOSIS — K61.1 PERIRECTAL ABSCESS: ICD-10-CM

## 2018-01-01 DIAGNOSIS — R63.4 UNINTENTIONAL WEIGHT LOSS: ICD-10-CM

## 2018-01-01 DIAGNOSIS — R63.4 WEIGHT LOSS: ICD-10-CM

## 2018-01-01 DIAGNOSIS — K70.30 ALCOHOLIC CIRRHOSIS OF LIVER WITHOUT ASCITES (HCC): ICD-10-CM

## 2018-01-01 DIAGNOSIS — K70.31 ALCOHOLIC CIRRHOSIS OF LIVER WITH ASCITES (HCC): ICD-10-CM

## 2018-01-01 DIAGNOSIS — Z72.0 TOBACCO USE: ICD-10-CM

## 2018-01-01 DIAGNOSIS — Z71.89 GOALS OF CARE, COUNSELING/DISCUSSION: ICD-10-CM

## 2018-01-01 DIAGNOSIS — G89.3 CANCER ASSOCIATED PAIN: ICD-10-CM

## 2018-01-01 DIAGNOSIS — C22.0 HEPATOCELLULAR CARCINOMA (HCC): ICD-10-CM

## 2018-01-01 DIAGNOSIS — D49.0 NEOPLASM, LIVER: ICD-10-CM

## 2018-01-01 DIAGNOSIS — K86.89 PANCREATIC MASS: ICD-10-CM

## 2018-01-01 DIAGNOSIS — C79.9: ICD-10-CM

## 2018-01-01 DIAGNOSIS — R97.20 ELEVATED PSA: ICD-10-CM

## 2018-01-01 DIAGNOSIS — R35.0 URINARY FREQUENCY: Primary | ICD-10-CM

## 2018-01-01 DIAGNOSIS — R18.8 OTHER ASCITES: Primary | ICD-10-CM

## 2018-01-01 DIAGNOSIS — K61.0 PERIANAL ABSCESS: Primary | ICD-10-CM

## 2018-01-01 DIAGNOSIS — C22.8: ICD-10-CM

## 2018-01-01 DIAGNOSIS — R53.0 NEOPLASTIC MALIGNANT RELATED FATIGUE: ICD-10-CM

## 2018-01-01 DIAGNOSIS — G89.3 CANCER ASSOCIATED PAIN: Primary | ICD-10-CM

## 2018-01-01 DIAGNOSIS — C22.8 PRIMARY MALIGNANT NEOPLASM OF LIVER (HCC): Primary | ICD-10-CM

## 2018-01-01 DIAGNOSIS — R79.89 ABNORMAL LFTS: ICD-10-CM

## 2018-01-01 DIAGNOSIS — R63.0 ANOREXIA: ICD-10-CM

## 2018-01-01 DIAGNOSIS — L03.311 ABDOMINAL WALL CELLULITIS: ICD-10-CM

## 2018-01-01 DIAGNOSIS — Z71.89 ADVANCE CARE PLANNING: ICD-10-CM

## 2018-01-01 DIAGNOSIS — Z51.11 CHEMOTHERAPY MANAGEMENT, ENCOUNTER FOR: Primary | ICD-10-CM

## 2018-01-01 DIAGNOSIS — K86.89 PANCREATIC MASS: Primary | ICD-10-CM

## 2018-01-01 DIAGNOSIS — K61.1 RECTAL ABSCESS: ICD-10-CM

## 2018-01-01 DIAGNOSIS — K55.069 SUPERIOR MESENTERIC VEIN THROMBOSIS (HCC): ICD-10-CM

## 2018-01-01 DIAGNOSIS — R18.8 OTHER ASCITES: ICD-10-CM

## 2018-01-01 LAB
AFP-TM SERPL-MCNC: 591.8 NG/ML (ref 0–8.9)
AFP-TM SERPL-MCNC: 612.5 NG/ML (ref 0–8.9)
ALBUMIN SERPL BCP-MCNC: 2 G/DL (ref 3.5–4.8)
ALBUMIN SERPL BCP-MCNC: 2.1 G/DL (ref 3.5–4.8)
ALBUMIN SERPL BCP-MCNC: 2.1 G/DL (ref 3.5–4.8)
ALBUMIN SERPL BCP-MCNC: 2.2 G/DL (ref 3.5–4.8)
ALBUMIN SERPL BCP-MCNC: 2.4 G/DL (ref 3.5–4.8)
ALBUMIN SERPL BCP-MCNC: 2.5 G/DL (ref 3.5–4.8)
ALBUMIN SERPL BCP-MCNC: 2.6 G/DL (ref 3.5–4.8)
ALBUMIN SERPL BCP-MCNC: 2.8 G/DL (ref 3.5–4.8)
ALBUMIN SERPL BCP-MCNC: 3 G/DL (ref 3.5–4.8)
ALBUMIN/GLOB SERPL: 0.5 {RATIO} (ref 1–2)
ALBUMIN/GLOB SERPL: 0.8 {RATIO} (ref 1–2)
ALBUMIN/GLOB SERPL: 0.8 {RATIO} (ref 1–2)
ALP SERPL-CCNC: 271 U/L (ref 32–100)
ALP SERPL-CCNC: 276 U/L (ref 32–100)
ALP SERPL-CCNC: 302 U/L (ref 32–100)
ALP SERPL-CCNC: 305 U/L (ref 32–100)
ALP SERPL-CCNC: 332 U/L (ref 32–100)
ALP SERPL-CCNC: 336 U/L (ref 32–100)
ALP SERPL-CCNC: 354 U/L (ref 32–100)
ALP SERPL-CCNC: 357 U/L (ref 32–100)
ALP SERPL-CCNC: 363 U/L (ref 32–100)
ALP SERPL-CCNC: 364 U/L (ref 32–100)
ALP SERPL-CCNC: 401 U/L (ref 32–100)
ALT SERPL-CCNC: 21 U/L (ref 17–63)
ALT SERPL-CCNC: 25 U/L (ref 17–63)
ALT SERPL-CCNC: 26 U/L (ref 17–63)
ALT SERPL-CCNC: 26 U/L (ref 17–63)
ALT SERPL-CCNC: 27 U/L (ref 17–63)
ALT SERPL-CCNC: 31 U/L (ref 17–63)
ALT SERPL-CCNC: 34 U/L (ref 17–63)
ALT SERPL-CCNC: 35 U/L (ref 17–63)
ALT SERPL-CCNC: 37 U/L (ref 17–63)
ALT SERPL-CCNC: 44 U/L (ref 17–63)
ALT SERPL-CCNC: 71 U/L (ref 17–63)
ANION GAP SERPL CALC-SCNC: 10 MMOL/L (ref 0–18)
ANION GAP SERPL CALC-SCNC: 12 MMOL/L (ref 0–18)
ANION GAP SERPL CALC-SCNC: 6 MMOL/L (ref 0–18)
ANION GAP SERPL CALC-SCNC: 6 MMOL/L (ref 0–18)
ANION GAP SERPL CALC-SCNC: 7 MMOL/L (ref 0–18)
ANION GAP SERPL CALC-SCNC: 8 MMOL/L (ref 0–18)
ANION GAP SERPL CALC-SCNC: 9 MMOL/L (ref 0–18)
APTT PPP: 29.8 SECONDS (ref 23.2–35.3)
AST SERPL-CCNC: 100 U/L (ref 15–41)
AST SERPL-CCNC: 105 U/L (ref 15–41)
AST SERPL-CCNC: 36 U/L (ref 15–41)
AST SERPL-CCNC: 43 U/L (ref 15–41)
AST SERPL-CCNC: 46 U/L (ref 15–41)
AST SERPL-CCNC: 46 U/L (ref 15–41)
AST SERPL-CCNC: 67 U/L (ref 15–41)
AST SERPL-CCNC: 71 U/L (ref 15–41)
AST SERPL-CCNC: 80 U/L (ref 15–41)
AST SERPL-CCNC: 81 U/L (ref 15–41)
AST SERPL-CCNC: 92 U/L (ref 15–41)
BASOPHILS # BLD: 0 K/UL (ref 0–0.2)
BASOPHILS # BLD: 0.1 K/UL (ref 0–0.2)
BASOPHILS NFR BLD: 0 %
BASOPHILS NFR BLD: 1 %
BILIRUB DIRECT SERPL-MCNC: 0.2 MG/DL (ref 0–0.2)
BILIRUB DIRECT SERPL-MCNC: 0.2 MG/DL (ref 0–0.2)
BILIRUB SERPL-MCNC: 0.3 MG/DL (ref 0.3–1.2)
BILIRUB SERPL-MCNC: 0.4 MG/DL (ref 0.3–1.2)
BILIRUB SERPL-MCNC: 0.4 MG/DL (ref 0.3–1.2)
BILIRUB SERPL-MCNC: 0.6 MG/DL (ref 0.3–1.2)
BILIRUB SERPL-MCNC: 0.6 MG/DL (ref 0.3–1.2)
BILIRUB SERPL-MCNC: 0.7 MG/DL (ref 0.3–1.2)
BILIRUB SERPL-MCNC: 0.8 MG/DL (ref 0.3–1.2)
BILIRUB SERPL-MCNC: 0.9 MG/DL (ref 0.3–1.2)
BILIRUB SERPL-MCNC: 0.9 MG/DL (ref 0.3–1.2)
BILIRUB UR QL: NEGATIVE
BILIRUB UR QL: NEGATIVE
BUN SERPL-MCNC: 10 MG/DL (ref 8–20)
BUN SERPL-MCNC: 11 MG/DL (ref 8–20)
BUN SERPL-MCNC: 12 MG/DL (ref 8–20)
BUN SERPL-MCNC: 13 MG/DL (ref 8–20)
BUN SERPL-MCNC: 13 MG/DL (ref 8–20)
BUN SERPL-MCNC: 6 MG/DL (ref 8–20)
BUN SERPL-MCNC: 7 MG/DL (ref 8–20)
BUN SERPL-MCNC: 8 MG/DL (ref 8–20)
BUN SERPL-MCNC: 9 MG/DL (ref 8–20)
BUN/CREAT SERPL: 10.3 (ref 10–20)
BUN/CREAT SERPL: 10.6 (ref 10–20)
BUN/CREAT SERPL: 10.9 (ref 10–20)
BUN/CREAT SERPL: 12.3 (ref 10–20)
BUN/CREAT SERPL: 13.3 (ref 10–20)
BUN/CREAT SERPL: 14.1 (ref 10–20)
BUN/CREAT SERPL: 14.5 (ref 10–20)
BUN/CREAT SERPL: 16.5 (ref 10–20)
BUN/CREAT SERPL: 16.9 (ref 10–20)
BUN/CREAT SERPL: 7.1 (ref 10–20)
BUN/CREAT SERPL: 9.3 (ref 10–20)
C DIFF TOX B STL QL: NEGATIVE
CALCIUM SERPL-MCNC: 8 MG/DL (ref 8.5–10.5)
CALCIUM SERPL-MCNC: 8 MG/DL (ref 8.5–10.5)
CALCIUM SERPL-MCNC: 8.1 MG/DL (ref 8.5–10.5)
CALCIUM SERPL-MCNC: 8.3 MG/DL (ref 8.5–10.5)
CALCIUM SERPL-MCNC: 8.5 MG/DL (ref 8.5–10.5)
CALCIUM SERPL-MCNC: 8.6 MG/DL (ref 8.5–10.5)
CALCIUM SERPL-MCNC: 8.7 MG/DL (ref 8.5–10.5)
CALCIUM SERPL-MCNC: 8.7 MG/DL (ref 8.5–10.5)
CALCIUM SERPL-MCNC: 8.8 MG/DL (ref 8.5–10.5)
CEA SERPL-MCNC: 1.4 NG/ML (ref 0–5)
CHLORIDE SERPL-SCNC: 101 MMOL/L (ref 95–110)
CHLORIDE SERPL-SCNC: 104 MMOL/L (ref 95–110)
CHLORIDE SERPL-SCNC: 105 MMOL/L (ref 95–110)
CHLORIDE SERPL-SCNC: 106 MMOL/L (ref 95–110)
CHLORIDE SERPL-SCNC: 106 MMOL/L (ref 95–110)
CHLORIDE SERPL-SCNC: 107 MMOL/L (ref 95–110)
CHLORIDE SERPL-SCNC: 108 MMOL/L (ref 95–110)
CHLORIDE SERPL-SCNC: 108 MMOL/L (ref 95–110)
CHLORIDE SERPL-SCNC: 109 MMOL/L (ref 95–110)
CLARITY UR: CLEAR
CO2 SERPL-SCNC: 21 MMOL/L (ref 22–32)
CO2 SERPL-SCNC: 22 MMOL/L (ref 22–32)
CO2 SERPL-SCNC: 22 MMOL/L (ref 22–32)
CO2 SERPL-SCNC: 23 MMOL/L (ref 22–32)
CO2 SERPL-SCNC: 26 MMOL/L (ref 22–32)
CO2 SERPL-SCNC: 27 MMOL/L (ref 22–32)
COLOR UR: YELLOW
COLOR UR: YELLOW
CREAT BLD-MCNC: 0.6 MG/DL (ref 0.5–1.5)
CREAT BLD-MCNC: 0.8 MG/DL (ref 0.5–1.5)
CREAT SERPL-MCNC: 0.64 MG/DL (ref 0.5–1.5)
CREAT SERPL-MCNC: 0.66 MG/DL (ref 0.5–1.5)
CREAT SERPL-MCNC: 0.68 MG/DL (ref 0.5–1.5)
CREAT SERPL-MCNC: 0.71 MG/DL (ref 0.5–1.5)
CREAT SERPL-MCNC: 0.73 MG/DL (ref 0.5–1.5)
CREAT SERPL-MCNC: 0.77 MG/DL (ref 0.5–1.5)
CREAT SERPL-MCNC: 0.79 MG/DL (ref 0.5–1.5)
CREAT SERPL-MCNC: 0.83 MG/DL (ref 0.5–1.5)
CREAT SERPL-MCNC: 0.83 MG/DL (ref 0.5–1.5)
CREAT SERPL-MCNC: 0.84 MG/DL (ref 0.5–1.5)
CREAT SERPL-MCNC: 0.86 MG/DL (ref 0.5–1.5)
EOSINOPHIL # BLD: 0.1 K/UL (ref 0–0.7)
EOSINOPHIL # BLD: 0.1 K/UL (ref 0–0.7)
EOSINOPHIL # BLD: 0.2 K/UL (ref 0–0.7)
EOSINOPHIL # BLD: 0.2 K/UL (ref 0–0.7)
EOSINOPHIL # BLD: 0.3 K/UL (ref 0–0.7)
EOSINOPHIL # BLD: 0.4 K/UL (ref 0–0.7)
EOSINOPHIL NFR BLD: 1 %
EOSINOPHIL NFR BLD: 1 %
EOSINOPHIL NFR BLD: 2 %
EOSINOPHIL NFR BLD: 2 %
EOSINOPHIL NFR BLD: 3 %
EOSINOPHIL NFR BLD: 3 %
EOSINOPHIL NFR BLD: 4 %
EOSINOPHIL NFR BLD: 5 %
ERYTHROCYTE [DISTWIDTH] IN BLOOD BY AUTOMATED COUNT: 14.5 % (ref 11–15)
ERYTHROCYTE [DISTWIDTH] IN BLOOD BY AUTOMATED COUNT: 14.5 % (ref 11–15)
ERYTHROCYTE [DISTWIDTH] IN BLOOD BY AUTOMATED COUNT: 14.9 % (ref 11–15)
ERYTHROCYTE [DISTWIDTH] IN BLOOD BY AUTOMATED COUNT: 14.9 % (ref 11–15)
ERYTHROCYTE [DISTWIDTH] IN BLOOD BY AUTOMATED COUNT: 15.4 % (ref 11–15)
ERYTHROCYTE [DISTWIDTH] IN BLOOD BY AUTOMATED COUNT: 15.5 % (ref 11–15)
ERYTHROCYTE [DISTWIDTH] IN BLOOD BY AUTOMATED COUNT: 15.6 % (ref 11–15)
ERYTHROCYTE [DISTWIDTH] IN BLOOD BY AUTOMATED COUNT: 15.6 % (ref 11–15)
ERYTHROCYTE [DISTWIDTH] IN BLOOD BY AUTOMATED COUNT: 16 % (ref 11–15)
ERYTHROCYTE [DISTWIDTH] IN BLOOD BY AUTOMATED COUNT: 16 % (ref 11–15)
ERYTHROCYTE [DISTWIDTH] IN BLOOD BY AUTOMATED COUNT: 16.2 % (ref 11–15)
ERYTHROCYTE [DISTWIDTH] IN BLOOD BY AUTOMATED COUNT: 18.8 % (ref 11–15)
GLOBULIN PLAS-MCNC: 3.3 G/DL (ref 2.5–3.7)
GLOBULIN PLAS-MCNC: 3.4 G/DL (ref 2.5–3.7)
GLOBULIN PLAS-MCNC: 4 G/DL (ref 2.5–3.7)
GLOBULIN PLAS-MCNC: 4.2 G/DL (ref 2.5–3.7)
GLOBULIN PLAS-MCNC: 4.3 G/DL (ref 2.5–3.7)
GLOBULIN PLAS-MCNC: 4.4 G/DL (ref 2.5–3.7)
GLOBULIN PLAS-MCNC: 4.5 G/DL (ref 2.5–3.7)
GLUCOSE SERPL-MCNC: 100 MG/DL (ref 70–99)
GLUCOSE SERPL-MCNC: 101 MG/DL (ref 70–99)
GLUCOSE SERPL-MCNC: 102 MG/DL (ref 70–99)
GLUCOSE SERPL-MCNC: 113 MG/DL (ref 70–99)
GLUCOSE SERPL-MCNC: 115 MG/DL (ref 70–99)
GLUCOSE SERPL-MCNC: 115 MG/DL (ref 70–99)
GLUCOSE SERPL-MCNC: 117 MG/DL (ref 70–99)
GLUCOSE SERPL-MCNC: 138 MG/DL (ref 70–99)
GLUCOSE SERPL-MCNC: 144 MG/DL (ref 70–99)
GLUCOSE SERPL-MCNC: 153 MG/DL (ref 70–99)
GLUCOSE SERPL-MCNC: 92 MG/DL (ref 70–99)
GLUCOSE UR-MCNC: NEGATIVE MG/DL
GLUCOSE UR-MCNC: NEGATIVE MG/DL
HAV AB SER QL IA: REACTIVE
HAV IGM SERPL QL IA: NONREACTIVE
HBV CORE AB SERPL QL IA: NONREACTIVE
HBV SURFACE AB SER-ACNC: <3.1 MIU/ML (ref ?–10)
HBV SURFACE AG SERPL QL IA: NONREACTIVE
HBV SURFACE AG SERPL QL IA: NONREACTIVE
HCT VFR BLD AUTO: 36.2 % (ref 41–52)
HCT VFR BLD AUTO: 37.3 % (ref 41–52)
HCT VFR BLD AUTO: 37.5 % (ref 41–52)
HCT VFR BLD AUTO: 37.7 % (ref 41–52)
HCT VFR BLD AUTO: 37.7 % (ref 41–52)
HCT VFR BLD AUTO: 40.5 % (ref 41–52)
HCT VFR BLD AUTO: 42.7 % (ref 41–52)
HCT VFR BLD AUTO: 42.8 % (ref 41–52)
HCT VFR BLD AUTO: 43 % (ref 41–52)
HCT VFR BLD AUTO: 43.2 % (ref 41–52)
HCT VFR BLD AUTO: 43.8 % (ref 41–52)
HCT VFR BLD AUTO: 48 % (ref 41–52)
HCV AB SERPL QL IA: NONREACTIVE
HGB BLD-MCNC: 11.7 G/DL (ref 13.5–17.5)
HGB BLD-MCNC: 12 G/DL (ref 13.5–17.5)
HGB BLD-MCNC: 12.1 G/DL (ref 13.5–17.5)
HGB BLD-MCNC: 12.2 G/DL (ref 13.5–17.5)
HGB BLD-MCNC: 12.3 G/DL (ref 13.5–17.5)
HGB BLD-MCNC: 13 G/DL (ref 13.5–17.5)
HGB BLD-MCNC: 13.7 G/DL (ref 13.5–17.5)
HGB BLD-MCNC: 14 G/DL (ref 13.5–17.5)
HGB BLD-MCNC: 14 G/DL (ref 13.5–17.5)
HGB BLD-MCNC: 14.2 G/DL (ref 13.5–17.5)
HGB BLD-MCNC: 14.5 G/DL (ref 13.5–17.5)
HGB BLD-MCNC: 15.4 G/DL (ref 13.5–17.5)
HGB UR QL STRIP.AUTO: NEGATIVE
HGB UR QL STRIP.AUTO: NEGATIVE
INR BLD: 1.1 (ref 0.9–1.2)
INR BLD: 1.2 (ref 0.9–1.2)
KETONES UR-MCNC: NEGATIVE MG/DL
KETONES UR-MCNC: NEGATIVE MG/DL
LEUKOCYTE ESTERASE UR QL STRIP.AUTO: NEGATIVE
LEUKOCYTE ESTERASE UR QL STRIP.AUTO: NEGATIVE
LYMPHOCYTES # BLD: 0.5 K/UL (ref 1–4)
LYMPHOCYTES # BLD: 0.5 K/UL (ref 1–4)
LYMPHOCYTES # BLD: 0.6 K/UL (ref 1–4)
LYMPHOCYTES # BLD: 0.7 K/UL (ref 1–4)
LYMPHOCYTES # BLD: 0.8 K/UL (ref 1–4)
LYMPHOCYTES # BLD: 0.8 K/UL (ref 1–4)
LYMPHOCYTES # BLD: 1 K/UL (ref 1–4)
LYMPHOCYTES # BLD: 1.1 K/UL (ref 1–4)
LYMPHOCYTES # BLD: 1.3 K/UL (ref 1–4)
LYMPHOCYTES NFR BLD: 10 %
LYMPHOCYTES NFR BLD: 12 %
LYMPHOCYTES NFR BLD: 6 %
LYMPHOCYTES NFR BLD: 6 %
LYMPHOCYTES NFR BLD: 7 %
LYMPHOCYTES NFR BLD: 8 %
LYMPHOCYTES NFR BLD: 8 %
MCH RBC QN AUTO: 25.9 PG (ref 27–32)
MCH RBC QN AUTO: 26.4 PG (ref 27–32)
MCH RBC QN AUTO: 26.6 PG (ref 27–32)
MCH RBC QN AUTO: 27 PG (ref 27–32)
MCH RBC QN AUTO: 27.2 PG (ref 27–32)
MCH RBC QN AUTO: 27.4 PG (ref 27–32)
MCH RBC QN AUTO: 28.2 PG (ref 27–32)
MCH RBC QN AUTO: 28.5 PG (ref 27–32)
MCH RBC QN AUTO: 28.8 PG (ref 27–32)
MCH RBC QN AUTO: 28.9 PG (ref 27–32)
MCHC RBC AUTO-ENTMCNC: 32 G/DL (ref 32–37)
MCHC RBC AUTO-ENTMCNC: 32.1 G/DL (ref 32–37)
MCHC RBC AUTO-ENTMCNC: 32.2 G/DL (ref 32–37)
MCHC RBC AUTO-ENTMCNC: 32.4 G/DL (ref 32–37)
MCHC RBC AUTO-ENTMCNC: 32.5 G/DL (ref 32–37)
MCHC RBC AUTO-ENTMCNC: 32.7 G/DL (ref 32–37)
MCHC RBC AUTO-ENTMCNC: 32.7 G/DL (ref 32–37)
MCHC RBC AUTO-ENTMCNC: 32.8 G/DL (ref 32–37)
MCHC RBC AUTO-ENTMCNC: 33 G/DL (ref 32–37)
MCHC RBC AUTO-ENTMCNC: 33 G/DL (ref 32–37)
MCV RBC AUTO: 80.7 FL (ref 80–100)
MCV RBC AUTO: 81.5 FL (ref 80–100)
MCV RBC AUTO: 82.6 FL (ref 80–100)
MCV RBC AUTO: 83.1 FL (ref 80–100)
MCV RBC AUTO: 83.5 FL (ref 80–100)
MCV RBC AUTO: 84.2 FL (ref 80–100)
MCV RBC AUTO: 84.6 FL (ref 80–100)
MCV RBC AUTO: 85.7 FL (ref 80–100)
MCV RBC AUTO: 87 FL (ref 80–100)
MCV RBC AUTO: 87.1 FL (ref 80–100)
MCV RBC AUTO: 87.7 FL (ref 80–100)
MCV RBC AUTO: 87.8 FL (ref 80–100)
MONOCYTES # BLD: 0.7 K/UL (ref 0–1)
MONOCYTES # BLD: 0.8 K/UL (ref 0–1)
MONOCYTES # BLD: 0.9 K/UL (ref 0–1)
MONOCYTES # BLD: 1.1 K/UL (ref 0–1)
MONOCYTES # BLD: 1.1 K/UL (ref 0–1)
MONOCYTES # BLD: 1.2 K/UL (ref 0–1)
MONOCYTES # BLD: 1.2 K/UL (ref 0–1)
MONOCYTES NFR BLD: 10 %
MONOCYTES NFR BLD: 11 %
MONOCYTES NFR BLD: 13 %
MONOCYTES NFR BLD: 13 %
MONOCYTES NFR BLD: 8 %
MONOCYTES NFR BLD: 9 %
MONOCYTES NFR BLD: 9 %
NEUTROPHILS # BLD AUTO: 4.5 K/UL (ref 1.8–7.7)
NEUTROPHILS # BLD AUTO: 5 K/UL (ref 1.8–7.7)
NEUTROPHILS # BLD AUTO: 6.1 K/UL (ref 1.8–7.7)
NEUTROPHILS # BLD AUTO: 6.4 K/UL (ref 1.8–7.7)
NEUTROPHILS # BLD AUTO: 6.8 K/UL (ref 1.8–7.7)
NEUTROPHILS # BLD AUTO: 6.9 K/UL (ref 1.8–7.7)
NEUTROPHILS # BLD AUTO: 8.1 K/UL (ref 1.8–7.7)
NEUTROPHILS # BLD AUTO: 8.1 K/UL (ref 1.8–7.7)
NEUTROPHILS # BLD AUTO: 8.3 K/UL (ref 1.8–7.7)
NEUTROPHILS # BLD AUTO: 8.5 K/UL (ref 1.8–7.7)
NEUTROPHILS # BLD AUTO: 9.6 K/UL (ref 1.8–7.7)
NEUTROPHILS NFR BLD: 70 %
NEUTROPHILS NFR BLD: 73 %
NEUTROPHILS NFR BLD: 74 %
NEUTROPHILS NFR BLD: 75 %
NEUTROPHILS NFR BLD: 77 %
NEUTROPHILS NFR BLD: 78 %
NEUTROPHILS NFR BLD: 79 %
NEUTROPHILS NFR BLD: 79 %
NEUTROPHILS NFR BLD: 80 %
NEUTROPHILS NFR BLD: 84 %
NEUTROPHILS NFR BLD: 84 %
NITRITE UR QL STRIP.AUTO: NEGATIVE
NITRITE UR QL STRIP.AUTO: NEGATIVE
OSMOLALITY UR CALC.SUM OF ELEC: 279 MOSM/KG (ref 275–295)
OSMOLALITY UR CALC.SUM OF ELEC: 283 MOSM/KG (ref 275–295)
OSMOLALITY UR CALC.SUM OF ELEC: 284 MOSM/KG (ref 275–295)
OSMOLALITY UR CALC.SUM OF ELEC: 285 MOSM/KG (ref 275–295)
OSMOLALITY UR CALC.SUM OF ELEC: 286 MOSM/KG (ref 275–295)
OSMOLALITY UR CALC.SUM OF ELEC: 287 MOSM/KG (ref 275–295)
OSMOLALITY UR CALC.SUM OF ELEC: 288 MOSM/KG (ref 275–295)
OSMOLALITY UR CALC.SUM OF ELEC: 288 MOSM/KG (ref 275–295)
OSMOLALITY UR CALC.SUM OF ELEC: 290 MOSM/KG (ref 275–295)
PATIENT FASTING: NO
PH UR: 6 [PH] (ref 5–8)
PH UR: 6 [PH] (ref 5–8)
PLATELET # BLD AUTO: 280 K/UL (ref 140–400)
PLATELET # BLD AUTO: 282 K/UL (ref 140–400)
PLATELET # BLD AUTO: 282 K/UL (ref 140–400)
PLATELET # BLD AUTO: 286 K/UL (ref 140–400)
PLATELET # BLD AUTO: 384 K/UL (ref 140–400)
PLATELET # BLD AUTO: 404 K/UL (ref 140–400)
PLATELET # BLD AUTO: 437 K/UL (ref 140–400)
PLATELET # BLD AUTO: 490 K/UL (ref 140–400)
PLATELET # BLD AUTO: 504 K/UL (ref 140–400)
PLATELET # BLD AUTO: 504 K/UL (ref 140–400)
PLATELET # BLD AUTO: 514 K/UL (ref 140–400)
PLATELET # BLD AUTO: 522 K/UL (ref 140–400)
PMV BLD AUTO: 7.7 FL (ref 7.4–10.3)
PMV BLD AUTO: 8.1 FL (ref 7.4–10.3)
PMV BLD AUTO: 8.2 FL (ref 7.4–10.3)
PMV BLD AUTO: 8.2 FL (ref 7.4–10.3)
PMV BLD AUTO: 8.3 FL (ref 7.4–10.3)
PMV BLD AUTO: 8.5 FL (ref 7.4–10.3)
PMV BLD AUTO: 8.5 FL (ref 7.4–10.3)
PMV BLD AUTO: 8.9 FL (ref 7.4–10.3)
POTASSIUM SERPL-SCNC: 3.6 MMOL/L (ref 3.3–5.1)
POTASSIUM SERPL-SCNC: 3.9 MMOL/L (ref 3.3–5.1)
POTASSIUM SERPL-SCNC: 3.9 MMOL/L (ref 3.3–5.1)
POTASSIUM SERPL-SCNC: 4 MMOL/L (ref 3.3–5.1)
POTASSIUM SERPL-SCNC: 4.1 MMOL/L (ref 3.3–5.1)
POTASSIUM SERPL-SCNC: 4.3 MMOL/L (ref 3.3–5.1)
PROT SERPL-MCNC: 5.8 G/DL (ref 5.9–8.4)
PROT SERPL-MCNC: 6 G/DL (ref 5.9–8.4)
PROT SERPL-MCNC: 6.1 G/DL (ref 5.9–8.4)
PROT SERPL-MCNC: 6.2 G/DL (ref 5.9–8.4)
PROT SERPL-MCNC: 6.4 G/DL (ref 5.9–8.4)
PROT SERPL-MCNC: 6.4 G/DL (ref 5.9–8.4)
PROT SERPL-MCNC: 6.7 G/DL (ref 5.9–8.4)
PROT SERPL-MCNC: 6.8 G/DL (ref 5.9–8.4)
PROT SERPL-MCNC: 7.3 G/DL (ref 5.9–8.4)
PROT UR-MCNC: NEGATIVE MG/DL
PROT UR-MCNC: NEGATIVE MG/DL
PROTHROMBIN TIME: 14.1 SECONDS (ref 11.8–14.5)
PROTHROMBIN TIME: 14.8 SECONDS (ref 11.8–14.5)
PROTHROMBIN TIME: 14.8 SECONDS (ref 11.8–14.5)
PROTHROMBIN TIME: 15.1 SECONDS (ref 11.8–14.5)
RBC # BLD AUTO: 4.44 M/UL (ref 4.5–5.9)
RBC # BLD AUTO: 4.51 M/UL (ref 4.5–5.9)
RBC # BLD AUTO: 4.51 M/UL (ref 4.5–5.9)
RBC # BLD AUTO: 4.54 M/UL (ref 4.5–5.9)
RBC # BLD AUTO: 4.64 M/UL (ref 4.5–5.9)
RBC # BLD AUTO: 4.73 M/UL (ref 4.5–5.9)
RBC # BLD AUTO: 4.9 M/UL (ref 4.5–5.9)
RBC # BLD AUTO: 4.92 M/UL (ref 4.5–5.9)
RBC # BLD AUTO: 5.03 M/UL (ref 4.5–5.9)
RBC # BLD AUTO: 5.04 M/UL (ref 4.5–5.9)
RBC # BLD AUTO: 5.13 M/UL (ref 4.5–5.9)
RBC # BLD AUTO: 5.47 M/UL (ref 4.5–5.9)
RBC #/AREA URNS AUTO: 1 /HPF
SODIUM SERPL-SCNC: 134 MMOL/L (ref 136–144)
SODIUM SERPL-SCNC: 136 MMOL/L (ref 136–144)
SODIUM SERPL-SCNC: 137 MMOL/L (ref 136–144)
SODIUM SERPL-SCNC: 138 MMOL/L (ref 136–144)
SODIUM SERPL-SCNC: 138 MMOL/L (ref 136–144)
SODIUM SERPL-SCNC: 139 MMOL/L (ref 136–144)
SODIUM SERPL-SCNC: 141 MMOL/L (ref 136–144)
SP GR UR STRIP: 1.02 (ref 1–1.03)
SP GR UR STRIP: 1.02 (ref 1–1.03)
TSH SERPL-ACNC: 2.23 UIU/ML (ref 0.45–5.33)
TSH SERPL-ACNC: 4.51 UIU/ML (ref 0.45–5.33)
TSH SERPL-ACNC: 5.65 UIU/ML (ref 0.45–5.33)
UROBILINOGEN UR STRIP-ACNC: 2
UROBILINOGEN UR STRIP-ACNC: 4
VIT C UR-MCNC: 20 MG/DL
VIT C UR-MCNC: NEGATIVE MG/DL
WBC # BLD AUTO: 10.1 K/UL (ref 4–11)
WBC # BLD AUTO: 10.5 K/UL (ref 4–11)
WBC # BLD AUTO: 10.5 K/UL (ref 4–11)
WBC # BLD AUTO: 10.7 K/UL (ref 4–11)
WBC # BLD AUTO: 12 K/UL (ref 4–11)
WBC # BLD AUTO: 6.5 K/UL (ref 4–11)
WBC # BLD AUTO: 6.7 K/UL (ref 4–11)
WBC # BLD AUTO: 7.8 K/UL (ref 4–11)
WBC # BLD AUTO: 8.3 K/UL (ref 4–11)
WBC # BLD AUTO: 8.3 K/UL (ref 4–11)
WBC # BLD AUTO: 9.4 K/UL (ref 4–11)
WBC # BLD AUTO: 9.7 K/UL (ref 4–11)
WBC #/AREA URNS AUTO: 2 /HPF

## 2018-01-01 PROCEDURE — 96413 CHEMO IV INFUSION 1 HR: CPT

## 2018-01-01 PROCEDURE — 99215 OFFICE O/P EST HI 40 MIN: CPT | Performed by: INTERNAL MEDICINE

## 2018-01-01 PROCEDURE — A4216 STERILE WATER/SALINE, 10 ML: HCPCS

## 2018-01-01 PROCEDURE — 99212 OFFICE O/P EST SF 10 MIN: CPT | Performed by: PHYSICIAN ASSISTANT

## 2018-01-01 PROCEDURE — 82565 ASSAY OF CREATININE: CPT

## 2018-01-01 PROCEDURE — 99213 OFFICE O/P EST LOW 20 MIN: CPT

## 2018-01-01 PROCEDURE — 99215 OFFICE O/P EST HI 40 MIN: CPT | Performed by: PHYSICIAN ASSISTANT

## 2018-01-01 PROCEDURE — 85025 COMPLETE CBC W/AUTO DIFF WBC: CPT

## 2018-01-01 PROCEDURE — 49083 ABD PARACENTESIS W/IMAGING: CPT | Performed by: INTERNAL MEDICINE

## 2018-01-01 PROCEDURE — 93010 ELECTROCARDIOGRAM REPORT: CPT | Performed by: EMERGENCY MEDICINE

## 2018-01-01 PROCEDURE — 0D9P0ZZ DRAINAGE OF RECTUM, OPEN APPROACH: ICD-10-PCS | Performed by: RADIOLOGY

## 2018-01-01 PROCEDURE — 36415 COLL VENOUS BLD VENIPUNCTURE: CPT | Performed by: CLINICAL NURSE SPECIALIST

## 2018-01-01 PROCEDURE — 71260 CT THORAX DX C+: CPT | Performed by: INTERNAL MEDICINE

## 2018-01-01 PROCEDURE — 99244 OFF/OP CNSLTJ NEW/EST MOD 40: CPT | Performed by: UROLOGY

## 2018-01-01 PROCEDURE — 80053 COMPREHEN METABOLIC PANEL: CPT

## 2018-01-01 PROCEDURE — 36415 COLL VENOUS BLD VENIPUNCTURE: CPT

## 2018-01-01 PROCEDURE — 99232 SBSQ HOSP IP/OBS MODERATE 35: CPT | Performed by: INTERNAL MEDICINE

## 2018-01-01 PROCEDURE — 74177 CT ABD & PELVIS W/CONTRAST: CPT | Performed by: PHYSICIAN ASSISTANT

## 2018-01-01 PROCEDURE — 74177 CT ABD & PELVIS W/CONTRAST: CPT | Performed by: INTERNAL MEDICINE

## 2018-01-01 PROCEDURE — 89050 BODY FLUID CELL COUNT: CPT | Performed by: INTERNAL MEDICINE

## 2018-01-01 PROCEDURE — 80076 HEPATIC FUNCTION PANEL: CPT

## 2018-01-01 PROCEDURE — P9047 ALBUMIN (HUMAN), 25%, 50ML: HCPCS

## 2018-01-01 PROCEDURE — 99244 OFF/OP CNSLTJ NEW/EST MOD 40: CPT | Performed by: PHYSICIAN ASSISTANT

## 2018-01-01 PROCEDURE — 93005 ELECTROCARDIOGRAM TRACING: CPT

## 2018-01-01 PROCEDURE — 99232 SBSQ HOSP IP/OBS MODERATE 35: CPT | Performed by: HOSPITALIST

## 2018-01-01 PROCEDURE — 74183 MRI ABD W/O CNTR FLWD CNTR: CPT | Performed by: HOSPITALIST

## 2018-01-01 PROCEDURE — 72197 MRI PELVIS W/O & W/DYE: CPT | Performed by: SPECIALIST

## 2018-01-01 PROCEDURE — 49083 ABD PARACENTESIS W/IMAGING: CPT | Performed by: PHYSICIAN ASSISTANT

## 2018-01-01 PROCEDURE — 99223 1ST HOSP IP/OBS HIGH 75: CPT | Performed by: HOSPITALIST

## 2018-01-01 PROCEDURE — 84480 ASSAY TRIIODOTHYRONINE (T3): CPT

## 2018-01-01 PROCEDURE — 84443 ASSAY THYROID STIM HORMONE: CPT

## 2018-01-01 PROCEDURE — 85027 COMPLETE CBC AUTOMATED: CPT | Performed by: CLINICAL NURSE SPECIALIST

## 2018-01-01 PROCEDURE — 99223 1ST HOSP IP/OBS HIGH 75: CPT | Performed by: INTERNAL MEDICINE

## 2018-01-01 PROCEDURE — 99244 OFF/OP CNSLTJ NEW/EST MOD 40: CPT | Performed by: NURSE PRACTITIONER

## 2018-01-01 PROCEDURE — 83690 ASSAY OF LIPASE: CPT | Performed by: EMERGENCY MEDICINE

## 2018-01-01 PROCEDURE — 85025 COMPLETE CBC W/AUTO DIFF WBC: CPT | Performed by: EMERGENCY MEDICINE

## 2018-01-01 PROCEDURE — 87205 SMEAR GRAM STAIN: CPT | Performed by: INTERNAL MEDICINE

## 2018-01-01 PROCEDURE — 99239 HOSP IP/OBS DSCHRG MGMT >30: CPT | Performed by: HOSPITALIST

## 2018-01-01 PROCEDURE — 84439 ASSAY OF FREE THYROXINE: CPT

## 2018-01-01 PROCEDURE — G0500 MOD SEDAT ENDO SERVICE >5YRS: HCPCS | Performed by: INTERNAL MEDICINE

## 2018-01-01 PROCEDURE — 80076 HEPATIC FUNCTION PANEL: CPT | Performed by: EMERGENCY MEDICINE

## 2018-01-01 PROCEDURE — 88160 CYTOPATH SMEAR OTHER SOURCE: CPT | Performed by: INTERNAL MEDICINE

## 2018-01-01 PROCEDURE — 99232 SBSQ HOSP IP/OBS MODERATE 35: CPT | Performed by: PHYSICIAN ASSISTANT

## 2018-01-01 PROCEDURE — 0DJD8ZZ INSPECTION OF LOWER INTESTINAL TRACT, VIA NATURAL OR ARTIFICIAL OPENING ENDOSCOPIC: ICD-10-PCS | Performed by: INTERNAL MEDICINE

## 2018-01-01 PROCEDURE — 36415 COLL VENOUS BLD VENIPUNCTURE: CPT | Performed by: RADIOLOGY

## 2018-01-01 PROCEDURE — 87070 CULTURE OTHR SPECIMN AEROBIC: CPT | Performed by: INTERNAL MEDICINE

## 2018-01-01 PROCEDURE — 85027 COMPLETE CBC AUTOMATED: CPT | Performed by: RADIOLOGY

## 2018-01-01 PROCEDURE — 84100 ASSAY OF PHOSPHORUS: CPT

## 2018-01-01 PROCEDURE — 89051 BODY FLUID CELL COUNT: CPT | Performed by: INTERNAL MEDICINE

## 2018-01-01 PROCEDURE — 81003 URINALYSIS AUTO W/O SCOPE: CPT

## 2018-01-01 PROCEDURE — 82140 ASSAY OF AMMONIA: CPT | Performed by: EMERGENCY MEDICINE

## 2018-01-01 PROCEDURE — 99285 EMERGENCY DEPT VISIT HI MDM: CPT

## 2018-01-01 PROCEDURE — 85610 PROTHROMBIN TIME: CPT | Performed by: RADIOLOGY

## 2018-01-01 PROCEDURE — 85610 PROTHROMBIN TIME: CPT | Performed by: CLINICAL NURSE SPECIALIST

## 2018-01-01 PROCEDURE — 76705 ECHO EXAM OF ABDOMEN: CPT | Performed by: FAMILY MEDICINE

## 2018-01-01 PROCEDURE — 80048 BASIC METABOLIC PNL TOTAL CA: CPT | Performed by: EMERGENCY MEDICINE

## 2018-01-01 PROCEDURE — 99233 SBSQ HOSP IP/OBS HIGH 50: CPT | Performed by: HOSPITALIST

## 2018-01-01 PROCEDURE — 45378 DIAGNOSTIC COLONOSCOPY: CPT | Performed by: INTERNAL MEDICINE

## 2018-01-01 PROCEDURE — 99212 OFFICE O/P EST SF 10 MIN: CPT | Performed by: UROLOGY

## 2018-01-01 PROCEDURE — 85610 PROTHROMBIN TIME: CPT

## 2018-01-01 PROCEDURE — 80048 BASIC METABOLIC PNL TOTAL CA: CPT

## 2018-01-01 PROCEDURE — 75989 ABSCESS DRAINAGE UNDER X-RAY: CPT | Performed by: HOSPITALIST

## 2018-01-01 PROCEDURE — 99204 OFFICE O/P NEW MOD 45 MIN: CPT

## 2018-01-01 PROCEDURE — 99222 1ST HOSP IP/OBS MODERATE 55: CPT | Performed by: INTERNAL MEDICINE

## 2018-01-01 RX ORDER — ACETAMINOPHEN 325 MG/1
650 TABLET ORAL EVERY 6 HOURS PRN
Status: DISCONTINUED | OUTPATIENT
Start: 2018-01-01 | End: 2018-01-01

## 2018-01-01 RX ORDER — METRONIDAZOLE 500 MG/1
500 TABLET ORAL EVERY 6 HOURS
Qty: 40 TABLET | Refills: 0 | Status: SHIPPED | OUTPATIENT
Start: 2018-01-01 | End: 2018-01-01

## 2018-01-01 RX ORDER — SODIUM CHLORIDE 9 MG/ML
INJECTION, SOLUTION INTRAVENOUS
Status: DISCONTINUED
Start: 2018-01-01 | End: 2018-01-01

## 2018-01-01 RX ORDER — 0.9 % SODIUM CHLORIDE 0.9 %
VIAL (ML) INJECTION
Status: DISCONTINUED
Start: 2018-01-01 | End: 2018-01-01

## 2018-01-01 RX ORDER — SODIUM CHLORIDE 9 MG/ML
INJECTION, SOLUTION INTRAVENOUS
Status: DISPENSED
Start: 2018-01-01 | End: 2018-01-01

## 2018-01-01 RX ORDER — HYDROCODONE BITARTRATE AND ACETAMINOPHEN 10; 325 MG/1; MG/1
1-2 TABLET ORAL EVERY 4 HOURS PRN
Qty: 120 TABLET | Refills: 0 | Status: SHIPPED | OUTPATIENT
Start: 2018-01-01 | End: 2018-01-01

## 2018-01-01 RX ORDER — ZOLPIDEM TARTRATE 5 MG/1
5 TABLET ORAL NIGHTLY PRN
Qty: 30 TABLET | Refills: 5 | Status: SHIPPED | OUTPATIENT
Start: 2018-01-01

## 2018-01-01 RX ORDER — ALBUMIN (HUMAN) 12.5 G/50ML
SOLUTION INTRAVENOUS
Status: DISPENSED
Start: 2018-01-01 | End: 2019-01-01

## 2018-01-01 RX ORDER — SODIUM CHLORIDE 9 MG/ML
INJECTION, SOLUTION INTRAVENOUS
Status: COMPLETED
Start: 2018-01-01 | End: 2018-01-01

## 2018-01-01 RX ORDER — MIDAZOLAM HYDROCHLORIDE 1 MG/ML
INJECTION INTRAMUSCULAR; INTRAVENOUS
Status: DISCONTINUED
Start: 2018-01-01 | End: 2018-01-01

## 2018-01-01 RX ORDER — ALBUMIN (HUMAN) 12.5 G/50ML
100 SOLUTION INTRAVENOUS AS NEEDED
Status: DISCONTINUED | OUTPATIENT
Start: 2018-01-01 | End: 2018-01-01

## 2018-01-01 RX ORDER — MIDAZOLAM HYDROCHLORIDE 5 MG/ML
INJECTION INTRAMUSCULAR; INTRAVENOUS
Status: DISCONTINUED | OUTPATIENT
Start: 2018-01-01 | End: 2018-01-01 | Stop reason: HOSPADM

## 2018-01-01 RX ORDER — ONDANSETRON 2 MG/ML
4 INJECTION INTRAMUSCULAR; INTRAVENOUS EVERY 6 HOURS PRN
Status: DISCONTINUED | OUTPATIENT
Start: 2018-01-01 | End: 2018-01-01

## 2018-01-01 RX ORDER — AMOXICILLIN AND CLAVULANATE POTASSIUM 875; 125 MG/1; MG/1
1 TABLET, FILM COATED ORAL 2 TIMES DAILY
Qty: 20 TABLET | Refills: 0 | Status: SHIPPED | OUTPATIENT
Start: 2018-01-01 | End: 2018-01-01

## 2018-01-01 RX ORDER — PANTOPRAZOLE SODIUM 40 MG/1
40 TABLET, DELAYED RELEASE ORAL DAILY
Qty: 30 TABLET | Refills: 2 | Status: SHIPPED | OUTPATIENT
Start: 2018-01-01

## 2018-01-01 RX ORDER — ALBUMIN (HUMAN) 12.5 G/50ML
SOLUTION INTRAVENOUS
Status: DISPENSED
Start: 2018-01-01 | End: 2018-01-01

## 2018-01-01 RX ORDER — 0.9 % SODIUM CHLORIDE 0.9 %
VIAL (ML) INJECTION
Status: DISPENSED
Start: 2018-01-01 | End: 2018-01-01

## 2018-01-01 RX ORDER — MORPHINE SULFATE 15 MG/1
15 TABLET ORAL EVERY 4 HOURS PRN
Qty: 90 TABLET | Refills: 0 | Status: SHIPPED | OUTPATIENT
Start: 2018-01-01 | End: 2018-01-01 | Stop reason: ALTCHOICE

## 2018-01-01 RX ORDER — MAGNESIUM CARB/ALUMINUM HYDROX 105-160MG
296 TABLET,CHEWABLE ORAL ONCE
Status: COMPLETED | OUTPATIENT
Start: 2018-01-01 | End: 2018-01-01

## 2018-01-01 RX ORDER — PANTOPRAZOLE SODIUM 40 MG/1
40 TABLET, DELAYED RELEASE ORAL DAILY
Qty: 30 TABLET | Refills: 1 | Status: SHIPPED | OUTPATIENT
Start: 2018-01-01 | End: 2018-01-01 | Stop reason: ALTCHOICE

## 2018-01-01 RX ORDER — ALBUMIN (HUMAN) 12.5 G/50ML
25 SOLUTION INTRAVENOUS ONCE
Status: COMPLETED | OUTPATIENT
Start: 2018-01-01 | End: 2018-01-01

## 2018-01-01 RX ORDER — SODIUM CHLORIDE 0.9 % (FLUSH) 0.9 %
3 SYRINGE (ML) INJECTION AS NEEDED
Status: DISCONTINUED | OUTPATIENT
Start: 2018-01-01 | End: 2018-01-01

## 2018-01-01 RX ORDER — TAMSULOSIN HYDROCHLORIDE 0.4 MG/1
0.4 CAPSULE ORAL DAILY
Qty: 30 CAPSULE | Refills: 11 | Status: SHIPPED | OUTPATIENT
Start: 2018-01-01 | End: 2018-01-01 | Stop reason: ALTCHOICE

## 2018-01-01 RX ORDER — HYDROCODONE BITARTRATE AND ACETAMINOPHEN 10; 325 MG/1; MG/1
1-2 TABLET ORAL EVERY 4 HOURS PRN
Qty: 120 TABLET | Refills: 0
Start: 2018-01-01

## 2018-01-01 RX ORDER — HYDROXYZINE HYDROCHLORIDE 25 MG/1
TABLET, FILM COATED ORAL EVERY 4 HOURS PRN
Qty: 60 TABLET | Refills: 2 | Status: SHIPPED | OUTPATIENT
Start: 2018-01-01

## 2018-01-01 RX ORDER — CEPHALEXIN 500 MG/1
500 CAPSULE ORAL 3 TIMES DAILY
Qty: 21 CAPSULE | Refills: 0 | Status: SHIPPED | OUTPATIENT
Start: 2018-01-01 | End: 2018-01-01

## 2018-01-01 RX ORDER — HYDROCODONE BITARTRATE AND ACETAMINOPHEN 5; 325 MG/1; MG/1
1-2 TABLET ORAL EVERY 4 HOURS PRN
Qty: 90 TABLET | Refills: 0 | Status: SHIPPED | OUTPATIENT
Start: 2018-01-01 | End: 2018-01-01

## 2018-01-01 RX ORDER — MIDAZOLAM HYDROCHLORIDE 1 MG/ML
4 INJECTION INTRAMUSCULAR; INTRAVENOUS ONCE
Status: COMPLETED | OUTPATIENT
Start: 2018-01-01 | End: 2018-01-01

## 2018-01-01 RX ORDER — DIPHENHYDRAMINE HCL 25 MG
25 CAPSULE ORAL EVERY 6 HOURS PRN
Status: DISCONTINUED | OUTPATIENT
Start: 2018-01-01 | End: 2018-01-01

## 2018-01-01 RX ORDER — HYDROCODONE BITARTRATE AND ACETAMINOPHEN 10; 325 MG/1; MG/1
1-2 TABLET ORAL EVERY 4 HOURS PRN
Qty: 120 TABLET | Refills: 0 | Status: SHIPPED | OUTPATIENT
Start: 2018-01-01

## 2018-01-01 RX ORDER — SULFAMETHOXAZOLE AND TRIMETHOPRIM 800; 160 MG/1; MG/1
1 TABLET ORAL 2 TIMES DAILY
Qty: 20 TABLET | Refills: 0 | Status: SHIPPED | OUTPATIENT
Start: 2018-01-01 | End: 2018-01-01

## 2018-01-01 RX ADMIN — ALBUMIN (HUMAN) 25 G: 12.5 SOLUTION INTRAVENOUS at 15:15:00

## 2018-01-01 RX ADMIN — ALBUMIN (HUMAN) 25 G: 12.5 SOLUTION INTRAVENOUS at 15:36:00

## 2018-01-01 RX ADMIN — ALBUMIN (HUMAN) 25 G: 12.5 SOLUTION INTRAVENOUS at 13:20:00

## 2018-01-01 RX ADMIN — ALBUMIN (HUMAN) 100 ML: 12.5 SOLUTION INTRAVENOUS at 10:20:00

## 2018-02-07 ENCOUNTER — LAB ENCOUNTER (OUTPATIENT)
Dept: LAB | Facility: HOSPITAL | Age: 68
End: 2018-02-07
Attending: FAMILY MEDICINE
Payer: MEDICAID

## 2018-02-07 DIAGNOSIS — I47.9 PAROXYSMAL TACHYCARDIA, UNSPECIFIED (HCC): Primary | ICD-10-CM

## 2018-02-07 PROCEDURE — 93005 ELECTROCARDIOGRAM TRACING: CPT

## 2018-02-07 PROCEDURE — 93010 ELECTROCARDIOGRAM REPORT: CPT | Performed by: FAMILY MEDICINE

## 2018-03-06 NOTE — TELEPHONE ENCOUNTER
Submitted to [facility]. As soon as note is complete, I will fax over. I will monitor this case also. Thank you!  Héctor Bloom [phone] [facility]

## 2018-03-06 NOTE — H&P
[address] [facility] [address] Gastroenterology                                                                                                  Clinic History and Physical     Pa Patient appears jaundiced. He denies pale or thin stools. No history of IV drug use. Endorses daily cigarettes and weekly ETOH use (at least 1 six-pack a week, however the daughter endorses more significant ETOH history).      Surgical Hx:  - Neck surgery i RESPIRATORY:  negative for severe shortness of breath  CARDIOVASCULAR:  negative for crushing sub-sternal chest pain  GASTROINTESTINAL:  see HPI  GENITOURINARY:  negative for dysuria or gross hematuria  INTEGUMENT/BREAST:  SKIN:  +jaundice +pruritus   AL 2017 (4.8) that was not followed up, per the patient's daughter. She endorses that her father does not go to see medical providers consistently.  The rectal abscess appears to be treated with I&D per chart review - patient does not provide me with these det Imaging & Referrals:  CT ABDOMEN+PELVIS(CONTRAST ONLY)(VHU=[id number])     CC: Mina Cool and Gibran Wallsast PA-C  3/6/2018

## 2018-03-06 NOTE — TELEPHONE ENCOUNTER
Routed to [facility]-please assist in expediting CT a/p for this patient per PB message below, thank you.

## 2018-03-06 NOTE — TELEPHONE ENCOUNTER
Spoke with Myah Ro RN in office:    - please assist me with expediting this patient's CT A/P. Dx per AVS. I am finishing his note, however if managed care needs any clarification re: expedition, please have them call me.

## 2018-03-06 NOTE — PATIENT INSTRUCTIONS
1. Schedule the CT scan of the abdomen/pelvis - I have asked my staff to assist with expediting this. 2. Labs today. 3. Discontinue smoking and alcohol use. 4. We will be in touch regarding next steps.

## 2018-03-07 NOTE — TELEPHONE ENCOUNTER
I discussed with the patient's daughter the results of the labs, she is very concerned about next steps and I encouraged her that we await CT A/P.     - ETOH with living with son - daily   - @[facility], ETOH is still quite frequent however \"not as much\"  -

## 2018-03-07 NOTE — TELEPHONE ENCOUNTER
Tamie DOMINGUEZ,     spoke to pts daughter informed awaiting approval for CT A/P advised NOT to schedule until we receive approval, daughter verbalized understanding.  Pt daughter requesting results of 3/6/18 labs , per your note (copied and pasted below) you wo

## 2018-03-08 NOTE — TELEPHONE ENCOUNTER
Test is authorized. Reached out to scheduling to contact. Pt is scheduled for tomorrow evening (03/09/18). Thank you!  Ryan Suarez [phone] [facility]

## 2018-03-12 PROBLEM — C79.9: Status: ACTIVE | Noted: 2018-01-01

## 2018-03-12 PROBLEM — K55.069 SUPERIOR MESENTERIC VEIN THROMBOSIS (HCC): Status: ACTIVE | Noted: 2018-01-01

## 2018-03-12 PROBLEM — C22.8: Status: ACTIVE | Noted: 2018-01-01

## 2018-03-12 PROBLEM — C22.8 PRIMARY MALIGNANT NEOPLASM OF LIVER (HCC): Status: ACTIVE | Noted: 2018-01-01

## 2018-03-12 PROBLEM — R63.4 WEIGHT LOSS: Status: ACTIVE | Noted: 2018-01-01

## 2018-03-12 NOTE — TELEPHONE ENCOUNTER
[de-identified] - Pts daughter Maggie Hayden states that she will be having surgery tomorrow and will not be available. There is a CALEB form on file for Maggie Hayden but not for her sister Randee Moreno.   She wanted to leave her sister Doris's phone # ( [phone]) in case anyone needs to

## 2018-03-12 NOTE — CONSULTS
[facility]OSP - [facility]    Report of Consultation    [facility] Patient Status:  Emergency    1950 MRN [id number]   Location [address] Attending Luis Jaime MD   Hosp Day # 0 PCP Rodriguez Packer MD     Date tenderness   No areas of abscess seen externally  Extremities:  No lower extremity edema noted. Without clubbing or cyanosis. Skin: Normal texture and turgor. Lymphatic:  No palpable cervical lymphadenopathy.     Laboratory Data:    Lab Results  Compon of the pancreatic head and barby hepatis, and it is difficult to tell where this arises from. This could be an enlarged replaced periportal lymph node or possibly a pancreatic head mass.  Recommend dedicated CT of the upper abdomen and pelvis with IV contra dilation. SPLEEN: Mildly enlarged, measuring 14 cm in maximal extent. Lateral splenule. ADRENALS:   Large heterogeneously enhancing left adrenal mass measuring approximately 10.4 x 8.3 cm, concerning for metastatic disease.  Right adrenal gland is [place] and Kimberli hernia is perceived. OTHER: No free air or fluid is seen in the abdomen or pelvis. There is a 1.2 cm subcutaneous nodule overlying the left posterior gluteal musculature.   Dictated by (CST): Dao Mathias MD on 3/10/2018 at 8:00     Approved by (CST): S pancreas would be better assessed on above-described MR of the abdomen. 7. Coronary and peripheral atherosclerosis. 8. Mild chronic-appearing inferior L2 compression fracture. Chronic/healed right lateral seventh rib fracture.  9. Subcutaneous 12 mm nodule

## 2018-03-12 NOTE — ED PROVIDER NOTES
Patient Seen in: [facility] Department    History   Patient presents with:  Weakness    Stated Complaint: sent by dr. Mónica Li for blood work and mri    HPI    Patient is here upon the advice of physician assistant through her gastroent chest pain  Respiratory: no shortness of breath  Gastrointestinal: no abdominal pain, no nausea, no vomiting  Genitourinary: no dysuria      Positive for stated complaint: sent by dr. Randi Sifuentes for blood work and mri  Other systems are as noted in HPI.   Co PANEL (8) - Abnormal; Notable for the following:        Result Value    Glucose 113 (*)     CO2 21 (*)     BUN 7 (*)     All other components within normal limits   CBC W/ DIFFERENTIAL - Abnormal; Notable for the following:     Neutrophil Absolute 8.5 (*)

## 2018-03-13 NOTE — H&P
[facility]    PATIENT'S NAME: Krishna Young PHYSICIAN: Kenneth Juan MD   PATIENT ACCOUNT#:   [id number]    LOCATION:  [address] [place] [address] RECORD #:   [id number]       YOB: 1950  ADMISSION DATE:       03/12/20 PHYSICAL EXAMINATION:    GENERAL:  Alert. Oriented to time, place and person. No acute distress. VITAL SIGNS:  Temperature 99.4, pulse 102, respiratory rate 18, blood pressure 140/74, pulse ox 99% on room air. HEENT:  Atraumatic.   Oropharynx clear,

## 2018-03-13 NOTE — PROGRESS NOTES
[facility]    Progress Note    Nate Villagomez Patient Status:  Inpatient    1950 MRN [id number]   Location [facility] 4W/SW/SE Attending Miles Gaffney MD   Hosp Day # 1 PCP Ondina Ramirez MD     Subjective:  Feels ok mass adjacent to the region of the pancreatic head which may extend into the barby hepatis, and it is difficult to tell where this arises from. This could be a enlarged replaced periportal lymph node  or possibly a pancreatic head mass.  Recommend a dedicat liver. Additional heterogeneous exophytic masslike lesion arising from or adjacent to the caudate lobe of the liver measures approximately 8.1 x 6.9 cm. There is an indeterminate hypodense 1.5 x 1.2 cm lesion in segment IV.  BILIARY: The gallbladder is pres thrombosis within the superior mesenteric vein, extending to the portal vein-superior mesenteric vein confluence. The splenic vein appears patent. There is a splenorenal shunt. RETROPERITONEUM: No mass or lymphadenopathy is apparent.   BONES:   There is a c collection measuring 2.6 x 1.6 cm. These may reflect perirectal abscesses. Dedicated surgical/gastroenterology assessment is recommended with consideration of colonoscopy to exclude rectal neoplasm. 6. No definite suspicious pancreatic mass is identified. total, the mass measures approximately 6.4 x 7.1 x 5.8 cm. This results in slight effacement of the hepatic IVC but no venous invasion is identified.  At the junction of segments 6 and 7 there is an irregular mass demonstrating heterogeneous  arterial phase in the upper abdomen. These appear to relate to perigastric and splenic collateral vessels, with communication at the portosplenic confluence. The superior mesenteric and portal veins appear patent.  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

## 2018-03-13 NOTE — OPERATIVE REPORT
COLONOSCOPY PROCEDURE REPORT     DATE OF PROCEDURE:  3/13/2018     PCP: Bell Coyne MD     PREOPERATIVE DIAGNOSIS:  Abnormal rectum on CT scan     POSTOPERATIVE DIAGNOSIS:  See impression. SURGEON:  WILBERT Li consultation appreciated. · MRI abdomen indicates that the suspected SMV thrombus on CT scan may have been artifact. · Would proceed with Oncology consultation.

## 2018-03-13 NOTE — CONSULTS
[facility]  Report of GI Consultation    Thersa Williston Patient Status:  Inpatient    1950 MRN [id number]   Location [facility] 4W/SW/SE Attending Kit Owen MD   Hosp Day # 0 PCP Pietro Jimenes MD     Date of Admission: clinically. Moderate inflammatory reaction into the perirectal fat. \"    Dr. Piedad Aranog performed incision and drainage and debridement of perirectal abscess on 5/21/2017.   The abscess was drained percutaneously near the anus opening it with a scalpel, irrigat hemoglobin 14.5 g; normal renal function. Last LFTs 3/6/2018 show AST 43 ALT 27 alkaline phosphatase 336 albumin 3.0. Alpha-fetoprotein tumor marker is 612. Wt Readings from Last 20 Encounters:  03/12/18 : 172 lb 2 oz (78.1 kg)  03/06/18 : 173 lb (78. ondansetron HCl (ZOFRAN) injection 4 mg 4 mg Intravenous Q6H PRN   [START ON 3/13/2018] Piperacillin Sod-Tazobactam So (ZOSYN) 3.375 g in dextrose 5 % 100 mL ADD-vantage 3.375 g Intravenous Q8H   influenza virus vaccine (FLUAD) ages 72 years and older in

## 2018-03-13 NOTE — PROGRESS NOTES
[facility] HOSP - [facility]    Progress Note    Adithya Parkinson Patient Status:  Inpatient    1950 MRN [id number]   Location [facility] ENDOSCOPY LAB SUITES Attending Saira Pacheco MD   Hosp Day # 1 PCP Angeles Pagan MD       Subjective: Mass  M/l Metastatic Hepatocellular carcinoma, AFP elevated and evaluated with MRI  Also with adrenal tumor, ?metastatic  Hep B and C negative  .8, CEA 1.4  GI and surgery consulted  Will undergo colonoscopy today  Will consult Heme Onc     DVT PPx: trace ascites, and upper abdominal collateral vessels. Particia Carmen.  Omer Billings MD  3/13/2018

## 2018-03-14 NOTE — CONSULTS
[facility]    PATIENT'S NAME: Regine Borne   ATTENDING PHYSICIAN: Felicia Hannon MD   CONSULTING PHYSICIAN: Cordelia Anand.  MD Betsy   PATIENT ACCOUNT#:   [id number]    LOCATION:  [address] [place] [address] #:   [id number]       DATE OF BIRTH: acetaminophen. ALLERGIES:  No known drug allergies. SOCIAL HISTORY:  The patient has history of heavy alcohol abuse. He has smoked in the past.  He denies any illicit drug use. He lives in the [place] area.      FAMILY HISTORY:  No reported history which time we will arrange for short interval outpatient followup to initiate systemic treatment. Thank you very much for the consultation request and for allowing us to participate in the care of this delightful patient. Dictated By Pattie Estrella

## 2018-03-14 NOTE — PROGRESS NOTES
GS      Feels ok     Po ok    Colonoscopy findings  Grossly neg  abd neg  MRI of rectum pending  Agree with oncology evaluation    Hepatocellular ca    Primary (most likely) or mets

## 2018-03-14 NOTE — DIETARY NOTE
ADULT NUTRITION INITIAL ASSESSMENT    Pt is at moderate nutrition risk. Pt does not meet malnutrition criteria.       RECOMMENDATIONS TO MD:  See Nutrition Intervention     NUTRITION DIAGNOSIS/PROBLEM:  Unintentional weight loss related to decreased abil 78.5 kg (173 lb)  05/20/17 : 84.5 kg (186 lb 4.8 oz)      GASTROINTESTINAL: pt reports mild history of constipation. Currently mild abd pain.       FOOD/NUTRITION RELATED HISTORY:  Appetite: Fair to good  Intake: breakfast from home this am.    Intake Meet

## 2018-03-14 NOTE — PROGRESS NOTES
[facility]    Hematology/Oncology   Progress Note    Dorothea Ryan Patient Status:  Inpatient    1950 MRN [id number]   Location [facility] 4W/SW/SE Attending Janeth Berg MD   Hosp Day # 2 PCP Speedy Boyer MD     [facility] left adrenal mass, suspicious for metastatic disease. A primary adrenal tumor is felt much less likely. 3. Large periportal lymph nodes, most compatible with mari metastatic disease.   4. The possible thrombus in the distal superior mesenteric vein and po

## 2018-03-15 NOTE — PROGRESS NOTES
[facility] HOSP - [facility]    Hematology/Oncology   Progress Note    Nirali De Guzman Patient Status:  Inpatient    1950 MRN [id number]   Location [facility] 4W/SW/SE Attending Razia Morales MD   Hosp Day # 3 PCP MD Ade Cardona Mele These masses have imaging features most compatible with hepatocellular carcinoma. Other neoplastic processes such as metastatic disease is felt less likely but not excluded. Correlate with alpha-fetoprotein/tumor markers.   2. Large left adrenal mass, suspi

## 2018-03-15 NOTE — PROGRESS NOTES
[facility]    Progress Note    Nate Villagomez Patient Status:  Inpatient    1950 MRN [id number]   Location [facility] ENDOSCOPY LAB SUITES Attending Miles Gaffney MD   Hosp Day # 2 PCP Ondina Ramirez MD       Subjective: ?metastatic  Hep B and C negative  .8, CEA 1.4  GI and surgery consulted  s/p c-scope  MRI today - if negative can go home if ok with Dr. Landry Ask     DVT PPx: SCDs  Full Code  Discussed with son in detail  >35 min spent  Results: Stigmata of portal hypertension including borderline splenomegaly, trace ascites, and upper abdominal collateral vessels.               Arturo Vogel

## 2018-03-15 NOTE — PAYOR COMM NOTE
--------------  ADMISSION REVIEW     Payor: Clarence Krishnan #:  [id number]  Authorization Number: [id number]    Admit date: 3/12/18  Admit time: 1859         Patient Seen in: [facility] Emergency Department    H and mri  Other systems are as noted in HPI. Constitutional and vital signs reviewed. All other systems reviewed and negative except as noted above. PSFH elements reviewed from today and agreed except as otherwise stated in HPI.     Physical Exam management. PAST MEDICAL HISTORY:  None. PAST SURGICAL HISTORY:  Perirectal abscess I and D procedure. MEDICATIONS:  Currently none. ALLERGIES:  No known drug allergies. FAMILY HISTORY:  Positive for diabetes and hypertension.     SOCIAL HIST surgery consult,  Dr. Jaswant Khan  were notified. Further recommendations to follow.

## 2018-03-15 NOTE — PROGRESS NOTES
[facility]    Progress Note    Yovany Potter Patient Status:  Inpatient    1950 MRN [id number]   Location [facility] 4W/SW/SE Attending Yeison David MD   Hosp Day # 3 PCP Carmel Bill MD     Subjective:  Feels ok position, there is a small intersphincteric fistula (series 4, images 21-23). This case with a large secondary abscess. Fluid extends into the intersphincteric space posteriorly and bilaterally with a relatively horseshoe-like configuration.  Foci suscepti the left gluteal cleft. 3. Probable sebaceous cyst of the left gluteal subcutaneous surface. 4. Lesser incidental findings as above.          Us Abdomen Limited (cpt=76705)    Result Date: 2/26/2018  PROCEDURE: US ABDOMEN LIMITED (CPT=76705)  COMPARISON: COMPARISON: [facility] ABDOMEN LIMITED (CPT=76705), 2/26/2018, 10:41. INDICATIONS: Unintentional weight loss. Possible pancreatic head mass on prior ultrasound.   TECHNIQUE: Multidetector CT images of the abdomen and pelvis were obtained rim-enhancing fluid collections along the posterior margin of the rectum, including a 4.1 x 2.9 cm collection at the rectosigmoid junction and the more caudal 2.6 x 1.6 and meter collection along the posterior wall of the rectum.   There are enlarged barby lymph nodes are also apparent and likely metastatic. AFP correlation and dedicated liver protocol MRI of the abdomen without and with contrast is recommended.  2. Large 10.4 cm heterogeneously enhancing left adrenal nodule, highly suspicious for additional COMPARISON: [facility], CT PELVIS (CONTRAST ONLY) (CPT=72193), 5/20/2017, 16:51. [address] [place] [address] ABDOMEN LIMITED (CPT=76705), 2/26/2018, 10:41.   [facility], CT ABDOMEN + PELVIS (CONTRAST ONLY) (CPT=74177), 3/0 8.9  x 10.5 x 12 cm, and effaces the upper pole of the left kidney, which is deviated inferiorly. Multiple upper abdominal collateral vessels marginate the anterior superior aspect of the left adrenal mass.  No definite vascular invasion although there is e processes such as metastatic disease is felt less likely but not excluded. Correlate with alpha-fetoprotein/tumor markers. 2. Large left adrenal mass, suspicious for metastatic disease. A primary adrenal tumor is felt much less likely.   3. Large periporta

## 2018-03-15 NOTE — PROGRESS NOTES
[facility] [place]    Progress Note    Angel Martin Patient Status:  Inpatient    1950 MRN [id number]   Location [facility] ENDOSCOPY LAB SUITES Attending Arabella Garay MD   Hosp Day # 3 PCP Didier Alfonso MD       Subjective: ?metastatic  Hep B and C negative  .8, CEA 1.4  GI and surgery consulted  s/p c-scope  Appreciate Heme Onc     Perirectal abscess  IR to drain in AM      DVT PPx: SCDs  Full Code  Discussed with son in detail  >35 min spent  Results:     Recent Labs

## 2018-03-16 NOTE — PLAN OF CARE
Altered Communication/Language Barrier    • Patient/Family is able to understand and participate in their care Progressing        ANXIETY    • Will report anxiety at manageable levels Progressing        CARDIOVASCULAR - ADULT    • Maintains optimal cardiac

## 2018-03-16 NOTE — IMAGING NOTE
1030 am Dr Crow Roy here dtr Jami Retana  here procedure risk benefits explained pt able to answer questions in [address] does speak Greek verbqlizes understandng of procedure able to repeat back what we are doing for him has been npo after 12mid per rn and pt.      1

## 2018-03-16 NOTE — PROGRESS NOTES
[facility]     Gastroenterology Progress Note    Nicholas Vera Patient Status:  Inpatient    1950 MRN [id number]   Location [facility] 4W/SW/SE Attending Camron Lorenzo MD   Hosp Day # 4 PCP Ree Narvaez MD surgical considerations. Colonoscopy with good preparation, to the cecum with Dr. Kiet Washburn 3/13/2018 revealing small internal hemorrhoids only in the rectal vault, no neoplasm. There were at least \"2 small sessile polyps transverse and sigmoid colon\". bilaterally, and with supralevator extension superiorly. The abscess, containing air and fluid, measures at least 8.1 cm in greatest dimension. 2. Additional small posterior intersphincteric fistula with a communicating tract of the left gluteal cleft.   3

## 2018-03-16 NOTE — DISCHARGE SUMMARY
[facility]    Discharge Summary    Ramandeep Cobian Patient Status:  Inpatient    1950 MRN [id number]   Location [facility] 4W/SW/SE Attending Skeeter Jeans, MD   Hosp Day # 4 PCP Ankita Cruz MD     Date of Admission: 3 Collection Time: 03/16/18 11:00 AM   Result Value Ref Range   Aerobic Smear 4+ WBCs seen N/A   Aerobic Smear No organisms seen N/A   2.  BLOOD CULTURE     Status: None (Preliminary result)   Collection Time: 03/12/18  4:12 PM   Result Value Ref Range   Bl umbilical vein . 5. Abnormal CT appearance of the rectum with wall thickening, surrounding inflammation, and presacral space edema suggesting proctitis.  Multiple rim-enhancing fluid collections along the posterior margin of the rectosigmoid junction and po 3. Large periportal lymph nodes, most compatible with mari metastatic disease. 4. The possible thrombus in the distal superior mesenteric vein and portal vein on comparison CT study is not evident by multiphase MRI.  This CT finding was likely related to Consulting Physician  Cardiovascular Diseases    Piyush Hunt MD Consulting Physician  Hematology and Oncology    Ryder Fitzpatrick MD Consulting Physician  GASTROENTEROLOGY    Hernandez Melo MD Consulting Physician  SURGERY, GENERAL

## 2018-03-16 NOTE — BRIEF PROCEDURE NOTE
[facility]  Procedure Note    Jaelynstef Harringtonmamadou Patient Status:  Inpatient    1950 MRN [id number]   Location [facility] 4W/SW/SE Attending Daniela Gillespie MD   Hosp Day # 4 PCP Willy Abdi MD     Procedure: Yonatan Lai

## 2018-03-19 NOTE — PAYOR COMM NOTE
Patient Seen in: [facility] Emergency Department     History   Patient presents with:  Weakness     Stated Complaint: sent by dr. Hope Loera for blood work and mri     HPI     Patient is here upon the advice of physician assistant through her Clinton Ingram Triage Vitals [03/12/18 1517]  BP: 140/74  Pulse: 102  Resp: 18  Temp: 99.4 °F (37.4 °C)  Temp src: Oral  SpO2: 99 %  O2 Device: None (Room air)              Labs Reviewed   BASIC METABOLIC PANEL (8) - Abnormal; Notable for the following:        Result Anastasiia pounds in the last several months. He had rectal urgency for the last month, but he denies any fever or chills. He had pruritus. Other 12-point review of systems negative.       PHYSICAL EXAMINATION:    GENERAL:  Alert.   Oriented to time, place and pers rhonchi. Cardiovascular: S1, S2.  Regular rate and rhythm. No murmurs. Equal pulses   Abdomen: Soft, nontender, nondistended. Positive bowel sounds. No rebound tenderness  Neurologic: No focal neurological deficits.   Musculoskeletal: Full range of motio CO2  21*  26   --             Imaging:   Mri Abdomen Liver(w+wo Iv) With Volume(tnb=90854)     Result Date: 3/13/2018  CONCLUSION:   1. 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  The largest hepatic mass is located in the caudate lobe, erythema.   Psychiatric: Appropriate mood and affect.     Current Inpatient Medications:      Current Facility-Administered Medications:   •  Normal Saline Flush 0.9 % injection 3 mL, 3 mL, Intravenous, PRN  •  acetaminophen (TYLENOL) tab 650 mg, 650 mg, Or JVD. No carotid bruits. Respiratory: Clear to auscultation bilaterally. No wheezes. No rhonchi. Cardiovascular: S1, S2.  Regular rate and rhythm. No murmurs. Equal pulses   Abdomen: Soft, nontender, nondistended. Positive bowel sounds.  No rebound tend Pelvis (soft Tissue) (w+wo) (cpt=72197)     Result Date: 3/14/2018  CONCLUSION:  1. There is a small posterior intersphincteric fistula arising from the 6:00 position with a large perirectal abscess.  This has a horseshoe shaped along the posterior peritone

## 2018-03-21 NOTE — PROGRESS NOTES
Cancer Center Progress Note    Patient Name: Georgette Lutz   YOB: 1950   Medical Record Number: [id number]   Attending Physician: Tula Severs, M.D.      Chief Complaint:  metastatic hepatocellular carcinoma    History of Present Illness:  76 -160 MG Oral Tab per tablet, Take 1 tablet by mouth 2 (two) times daily. , Disp: 20 tablet, Rfl: 0  •  metRONIDAZOLE 500 MG Oral Tab, Take 1 tablet (500 mg total) by mouth every 6 (six) hours. , Disp: 40 tablet, Rfl: 0    Allergies:  No Known Allergies adenopathy and adrenal metastasis  –orders placed for sorafenib  –discussed various our recent approvals for metastatic hepatocellular carcinoma including nivolumab and regorafenib as subsequent lines of therapy  –Return to clinic in 1 month  –For poor renetta

## 2018-03-29 NOTE — TELEPHONE ENCOUNTER
Called Priscila Arceo (daughter) to see if her dad has received his medications yet, they said he has not, and that she hasn't heard anything regarding approval or shipment. I assured her I would touch base with our insurance dept and see what I can find out.   I re

## 2018-03-29 NOTE — TELEPHONE ENCOUNTER
Daughter called, they will receive their specialty medication tomorrow. Education and labs set up for 4/3. Reminded daughter to bring the meds with to appt, she verbalized understanding.

## 2018-03-29 NOTE — PAYOR COMM NOTE
DISCHARGE REVIEW    Payor: Clarence Krishnan #:  [id number]  Authorization Number: [id number]    Admit date: 3/12/18  Admit time:  1859  Discharge Date: 3/16/2018  5:57 PM     Admitting Physician: Audrey Coronado MD drain  Aspirate was sent for culture  Will d/c on oral abx and f/u with Dr. Magalys Chacon on 03/12/18  1.  AEROBIC BACTERIAL CULTURE     Status: None (Preliminary result)   Collection Time: 03/16/18 11:00 AM   Result Value Ref Range   Aerob mesenteric vein extending to the superior mesenteric/portal vein confluence. 4. Stigmata of portal hypertension including mild splenomegaly, splenorenal shunt, and recanalization of the umbilical vein .  5. Abnormal CT appearance of the rectum with wall thi likely but not excluded. Correlate with alpha-fetoprotein/tumor markers. 2. Large left adrenal mass, suspicious for metastatic disease. A primary adrenal tumor is felt much less likely.   3. Large periportal lymph nodes, most compatible with mari metastat [address]  [phone]  Schedule an appointment as soon as possible for a visit in 1 week      Consultants     Provider Role Specialty    Maria Elena Fuller MD Consulting Physician  Cardiovascular Diseases    Marielena Cordova MD Consulting Physician  Hematology and

## 2018-04-03 NOTE — TELEPHONE ENCOUNTER
Triamcinolone is ok to use on dry patches of skin, located on mid-left, central upper back and forearms.

## 2018-04-03 NOTE — PROGRESS NOTES
Medication Education Record: Oral Therapy    Learner:  Patient and Family Member    Barriers / Limitations:  Language    Diagnosis:   Hepatocellular carcinoma      Medication Name Dose/Strength Frequency   Sorafenib 400 mg ( mg tablets) Twice daily, Prochloperazine (Compazine) 10 mg every 6 hours  Helpful hints during cancer treatment:    Diet:  o Avoid greasy or spicy foods on days surrounding chemotherapy  o Eat small frequent meals per day (6-7 meals) rather than 3 large meals  o Choose high travis especially after baths.  o If scalp hair loss has occurred, protect the scalp from the sun by wearing a hat and use sunscreen. Apply alcohol-free moisturizer as needed.       When to call the doctor:  • Fever of 100.5 or greater or shaking chills  • Nausea or at home, the following precautions must be taken to lessen any exposure to the medication. Handling oral medication:    1. Confirm that the medication is appropriately labeled.   2. Only patients who need chemotherapy should take or touch the medicat 1. Caregivers must wear gloves if exposed to the patient’s blood, urine, stool or vomit. Dispose of the used gloves after each use and wash hands with soap and water.   2. Any sheets or clothes soiled with the bodily fluids should be machine washed twice was provided to the patient. The patient/support person  was attentive during education, verbalized understanding, all questions were answered and patient was instructed to call with further questions.      Reinforcement of education is needed at next visi

## 2018-04-03 NOTE — PATIENT INSTRUCTIONS
Medication Education Record: Oral Therapy    Learner:  Patient and Family Member    Barriers / Limitations:  Language    Diagnosis:   Hepatocellular carcinoma      Medication Name Dose/Strength Frequency   Sorafenib 400 mg ( mg tablets) Twice daily, Diet:  o Avoid greasy or spicy foods on days surrounding chemotherapy  o Eat small frequent meals per day (6-7 meals) rather than 3 large meals  o Choose high calorie/high protein foods (chicken, hard cooked eggs, peanut butter, cheese)  o If nauseated, wearing a hat and use sunscreen. Apply alcohol-free moisturizer as needed.       When to call the doctor:  • Fever of 100.5 or greater or shaking chills  • Nausea/vomiting not controlled with anti-nausea medications: Unable to drink for 24 hours or have si take or touch the medication. 3. If caregivers are involved, caregivers should wear gloves when administering the medication to protect against exposure. Discard used gloves immediately – do not use for anything else.   4. Wash hands thoroughly before a machine washed twice in hot water with regular laundry detergent. Do not wash soiled garments with hands. If the soiled garments cannot be washed right away, place them in a sealed plastic bag until they can be washed.    3. Absorbable undergarments, or a

## 2018-04-04 NOTE — TELEPHONE ENCOUNTER
Kiko bledsoe has questions about SORAfenib Tosylate 200 MG Oral Tab, She asked to speak to [facility].  dmitry

## 2018-04-09 NOTE — TELEPHONE ENCOUNTER
Toxicities:  Sorafenib 200mg tabs (Take two tabs = 400 mg by mouth BID 12 hrs apart) C1 D6 today    Constipation/Rectal Pain    Fever: Grade 0 (Denies c/s/f.  No cold or flu symptoms.)  Fatigue: Grade 1 (Gerry Garcia reports her father does seem to be sleeping more with Dr Sarah Boo today. She & her family want Dr Douglas Brennan opinion on how long her father has to live. She stated that he told her he would know if the chemotherapy pill is working in about three months. Pam Xie was angry, frustrated & tearful.  She wants Dr Sarah Boo t

## 2018-04-09 NOTE — TELEPHONE ENCOUNTER
Daughter Luis Pinto calling stating her father is having butt pain she says she does not see any sores.  He has been sleeping a lot dmitry

## 2018-04-11 NOTE — TELEPHONE ENCOUNTER
Toxicities:  C1 D8 Sorafenib 200mg tab (Take 2 tabs = 400mg by mouth BID 12 hrs apart)     Constipation/Burning Rectal pain    Fever: Grade 0 (Denies c/s/f. He is voiding yellow urine. Denies urgency, frequency or burning.  No hematuria.)  Dehydration: Felix Saravia

## 2018-04-11 NOTE — TELEPHONE ENCOUNTER
Kaylyn Altman, Social Work was speaking with Hannah Nephew, Lauren's daughter. She mentioned to Kaylyn Altman her Dad is having rectal burning. I attempted to reach [facility]. No answer.  I left a voice mail message asking them to please call the office so I may do a telephon

## 2018-04-12 NOTE — TELEPHONE ENCOUNTER
I returned Doris's call. She reports her father is still passing hard to and having pain while he is having the BM. She reports the pain continues for 2 hrs after the BM. I explained that I spoke with her sister yesterday.  She reported Tyshawn Mcgarry was only

## 2018-04-12 NOTE — TELEPHONE ENCOUNTER
Luis Millie the daughter called asking for her dad to be seen sooner, she said he is still having rectal pain. It is worse today then the other day, he cant sit down and she doesn't want him to go the entire weekend like this.  I offered her a 1:45 pm appointmen

## 2018-04-13 NOTE — PROGRESS NOTES
Cancer Center Progress Note    Patient Name: Nicholas Vera   YOB: 1950   Medical Record Number: [id number]   Attending Physician: Elissa Randle M.D.      Chief Complaint:  metastatic hepatocellular carcinoma    History of Present Illness:  76 Medications:    Current Outpatient Prescriptions:   •  HYDROcodone-acetaminophen 5-325 MG Oral Tab, Take 1-2 tablets by mouth every 4 (four) hours as needed for Pain., Disp: 90 tablet, Rfl: 0  •  SORAfenib Tosylate 200 MG Oral Tab, Take 2 tablets (400 mg t Radiology:  None new    Cancer Staging  No matching staging information was found for the patient.     Impression and Plan:  79-year-old male with cirrhosis, likely secondary to alcohol abuse, being evaluated by Medical Oncology for metastatic hepatoc

## 2018-05-01 NOTE — TELEPHONE ENCOUNTER
Toxicities: C1 D28 Sorafenib 200mg tab (Take 2 tabs=400mg by mouth BID 12 hrs apart)    Constipation/Rectal Pain     Fever: Grade 0 (Surekha Watson reports her father denies c/s. Temp 97.6. He is voiding clear urine.  He denies urgency, frequency or burning with urin

## 2018-05-02 NOTE — PROGRESS NOTES
Cancer Center Progress Note    Patient Name: Екатерина Bloom   YOB: 1950   Medical Record Number: [id number]   Attending Physician: Se Katz M.D.      Chief Complaint:  metastatic hepatocellular carcinoma    History of Present Illness:  75 Sexual activity: Not on file     Other Topics Concern   None on file     Social History Narrative   None on file       Current Medications:    Current Outpatient Prescriptions:   •  HYDROcodone-acetaminophen 5-325 MG Oral Tab, Take 1-2 tablets by mouth tomasa 04/13/2018    04/13/2018   K 4.3 04/13/2018    04/13/2018   CO2 25 04/13/2018       Radiology:  None new    Cancer Staging  No matching staging information was found for the patient.     Impression and Plan:  58-year-old male with cirrhosis, lik

## 2018-05-23 NOTE — TELEPHONE ENCOUNTER
Natalia Dill the patient daughter is calling because the patient CT scan is still not authorized and he was supposed to have it before coming back and his next appt is 5/30/18, please advise

## 2018-05-30 NOTE — ED INITIAL ASSESSMENT (HPI)
Patient is here with a rectal abscess that patients daughter states he had surgery on about a year ago. She states he has been having increased pain in the rectal area and is now having some drainage from the area.

## 2018-05-30 NOTE — PROGRESS NOTES
Cancer Center Progress Note    Patient Name: Roopa Roth   YOB: 1950   Medical Record Number: [id number]   Attending Physician: Jonathan Botello M.D.      Chief Complaint:  metastatic hepatocellular carcinoma    History of Present Illness:  76 Comment: per day (quit drinking 3/15/18)    Drug use: No    Sexual activity: Not on file     Other Topics Concern   None on file     Social History Narrative   None on file       Current Medications:    Current Outpatient Prescriptions:   •  HYDROcodone-ac new    Cancer Staging  No matching staging information was found for the patient.     Impression and Plan:  79-year-old male with cirrhosis, likely secondary to alcohol abuse, being evaluated by Medical Oncology for metastatic hepatocellular carcinoma diagn

## 2018-05-30 NOTE — ED PROVIDER NOTES
Patient Seen in: [address]    History   CC:  Patient presents with:  Anal Problem (GI)    Stated Complaint: absess on buttocks    ------------------------------  Per Rn: (paraphrase)    h/o rectal abscess, surgery I y a air)    Current:/80   Pulse 102   Temp 99.5 °F (37.5 °C) (Oral)   Resp 18   Ht 172.7 cm (5' 8\")   Wt 71.2 kg   SpO2 99%   BMI 23.87 kg/m²   We recommend that you schedule follow up care with a primary care provider within the next three months to ob day          Medications Prescribed:  Current Discharge Medication List    START taking these medications    Amoxicillin-Pot Clavulanate 875-125 MG Oral Tab  Take 1 tablet by mouth 2 (two) times daily.   Qty: 20 tablet Refills: 0

## 2018-06-04 NOTE — PATIENT INSTRUCTIONS
Medication Education Record: IV Therapy    Learner:  Patient and Family Member    Barriers / Limitations:  Language    Diagnosis:   Hepatocellular carcinoma    IV Cancer Treatment Name(s): Nivolumab  IV Cancer Treatment Frequency Once every 2 weeks    Numb occurring.     Recommended Anti-nausea medications (as directed by your provider):  Prochloperazine (Compazine) 10 mg every 6 hours      Helpful hints during cancer treatment:    Diet:  o Avoid greasy or spicy foods on days surrounding chemotherapy  o Eat s contact the triage nurse for further instructions. Skin Care  o Avoid direct sunlight.  o Wear a broad-spectrum sunscreen with an SPF of 30 or higher on any skin exposed to the sun. Re-apply every 2 hours if in the sun and after bathing or sweating.   o F the clinic or at home, the following precautions must be taken to lessen any exposure to the medication. Handling Body Waste:   1. Caregivers must wear gloves if exposed to the patient’s blood, urine, stool or vomit.   Dispose of the used gloves after e addition, menstrual cycles can become irregular during and after treatment, so you may not know if you are at a time in your cycle when you could become pregnant or if you are actually pregnant.

## 2018-06-04 NOTE — PROGRESS NOTES
Medication Education Record: IV Therapy    Learner:  Patient and Family Member    Barriers / Limitations:  Language    Diagnosis:   Hepatocellular carcinoma    IV Cancer Treatment Name(s): Nivolumab  IV Cancer Treatment Frequency Once every 2 weeks    Numb occurring.     Recommended Anti-nausea medications (as directed by your provider):  Prochloperazine (Compazine) 10 mg every 6 hours      Helpful hints during cancer treatment:    Diet:  o Avoid greasy or spicy foods on days surrounding chemotherapy  o Eat s contact the triage nurse for further instructions. Skin Care  o Avoid direct sunlight.  o Wear a broad-spectrum sunscreen with an SPF of 30 or higher on any skin exposed to the sun. Re-apply every 2 hours if in the sun and after bathing or sweating.   o F the clinic or at home, the following precautions must be taken to lessen any exposure to the medication. Handling Body Waste:   1. Caregivers must wear gloves if exposed to the patient’s blood, urine, stool or vomit.   Dispose of the used gloves after e addition, menstrual cycles can become irregular during and after treatment, so you may not know if you are at a time in your cycle when you could become pregnant or if you are actually pregnant.       Cancer treatment education, including treatment plan, ndiaye

## 2018-06-19 NOTE — PROGRESS NOTES
Pt here for C1D1 Opdivo.   Arrives Ambulating independently, accompanied by Family member           Modifications in dose or schedule: No     Frequency of blood return and site check throughout administration: Prior to administration and At completion of th

## 2018-06-20 NOTE — TELEPHONE ENCOUNTER
Called Kori Petty, his daughter Kumar Meyer answered and said he is out right now. She reports that he is feeling well post treatment.   I encouraged her to call if he has any questions, concerns, or side effects, she verbalized understanding and thanked me for call

## 2018-07-02 NOTE — PROGRESS NOTES
Cancer Center Progress Note    Patient Name: Yamileth Guaman   YOB: 1950   Medical Record Number: [id number]   Attending Physician: Jennifer Cannon M.D.      Chief Complaint:  metastatic hepatocellular carcinoma    History of Present Illness:  76 Comment: per day (quit drinking 3/15/18)    Drug use: No    Sexual activity: Not on file     Other Topics Concern   None on file     Social History Narrative   None on file       Current Medications:    Current Outpatient Prescriptions:   •  HYDROcodone Radiology:  None new    Cancer Staging  No matching staging information was found for the patient.     Impression and Plan:  42-year-old male with cirrhosis, likely secondary to alcohol abuse, being evaluated by Medical Oncology for metastatic hepatoc

## 2018-07-03 NOTE — PROGRESS NOTES
Pt here for Shaun Busby.   Arrives Ambulating independently, accompanied by Family member           Modifications in dose or schedule: No     Frequency of blood return and site check throughout administration: Prior to administration and At completion of th

## 2018-07-16 NOTE — PROGRESS NOTES
Cancer Center Progress Note    Patient Name: Georgette Lutz   YOB: 1950   Medical Record Number: [id number]   Attending Physician: Tula Severs, M.D.      Chief Complaint:  metastatic hepatocellular carcinoma    History of Present Illness:  75 Comment: per day (quit drinking 3/15/18)    Drug use: No    Sexual activity: Not on file     Other Topics Concern    Caffeine Concern Yes    Comment: Coffee, soda     Social History Narrative   None on file       Current Medications:    Current Outpatient patient. Impression and Plan:  69-year-old male with cirrhosis, likely secondary to alcohol abuse, being evaluated by Medical Oncology for metastatic hepatocellular carcinoma diagnosed in March 2018. He has metastatic adenopathy and adrenal metastasis.

## 2018-07-17 NOTE — PROGRESS NOTES
Pt here for [address].   Arrives Ambulating independently, accompanied by Family member           Modifications in dose or schedule: No per notes from Dr. Joann Dan with cycle #3 of nivolumab tolerating well\"    Complaint of frequency of urination

## 2018-07-30 NOTE — PROGRESS NOTES
Cancer Center Progress Note    Patient Name: Constanza Ingram   YOB: 1950   Medical Record Number: [id number]   Attending Physician: Joann Clemons M.D.      Chief Complaint:  metastatic hepatocellular carcinoma    History of Present Illness:  70 Comment: per day (quit drinking 3/15/18)    Drug use: No    Sexual activity: Not on file     Other Topics Concern    Caffeine Concern Yes    Comment: Coffee, soda     Social History Narrative   None on file       Current Medications:    Current Outpatient Radiology:  None new    Cancer Staging  No matching staging information was found for the patient.     Impression and Plan:  89-year-old male with cirrhosis, likely secondary to alcohol abuse, being evaluated by Medical Oncology for metastatic hepat

## 2018-07-31 NOTE — PROGRESS NOTES
Pt here for [address].   Arrives Ambulating independently, accompanied by Family member           Modifications in dose or schedule: No per notes from Dr. Stefano Sutherland with cycle #4 of nivolumab tolerating well\"\"    Doesn't have much of an appetite, pe

## 2018-08-14 NOTE — PROGRESS NOTES
Cancer Center Progress Note    Patient Name: Zuri Sullivan   YOB: 1950   Medical Record Number: [id number]   Attending Physician: Violetta Osborne M.D.      Chief Complaint:  metastatic hepatocellular carcinoma    History of Present Illness:  70 Comment: 2-3 ciggs/day (quit smoking 3/15/18)    Alcohol use No    Comment: per day (quit drinking 3/15/18)    Drug use: No    Sexual activity: Not on file     Other Topics Concern    Caffeine Concern Yes    Comment: Coffee, soda     Social History Narrati 07/30/2018   K 3.9 07/30/2018    07/30/2018   CO2 23 07/30/2018       Radiology:  Reviewed CT chest and pelvis from 8/11/2018. Adrenal metastasis has slightly enlarged. Majority of his liver disease is relatively stable with some early progression.

## 2018-08-14 NOTE — PROGRESS NOTES
Pt here for C5D1 Opdivo. Arrives Ambulating independently, accompanied by Family member           Modifications in dose or schedule: No    Opdivo given , no complaints today. Tolerated well.       Frequency of blood return and site check throughout adminis

## 2018-08-28 NOTE — PROGRESS NOTES
Pt here for Dani Watson.   Arrives Ambulating independently, accompanied by Family member   (daughter and grandson)    Modifications in dose or schedule: No    Began infusion at 60 min rate, and near the end the family asked about running opdivo at 30 min r

## 2018-08-28 NOTE — PROGRESS NOTES
Cancer Center Progress Note    Patient Name: Nicholas Vera   YOB: 1950   Medical Record Number: [id number]   Attending Physician: Elissa Randle M.D.      Chief Complaint:  metastatic hepatocellular carcinoma    History of Present Illness:  70 Comment: 2-3 ciggs/day (quit smoking 3/15/18)    Alcohol use No    Comment: per day (quit drinking 3/15/18)    Drug use: No    Sexual activity: Not on file     Other Topics Concern    Caffeine Concern Yes    Comment: Coffee, soda     Social History Narrati 08/14/2018   ALT 31 08/14/2018   BILT 0.4 08/14/2018   TP 6.0 08/14/2018   ALB 2.0 (L) 08/14/2018   GLOBULIN 4.0 (H) 08/14/2018    08/14/2018   K 4.0 08/14/2018    08/14/2018   CO2 23 08/14/2018       Radiology:  Reviewed CT chest and pelvis fr

## 2018-09-06 NOTE — TELEPHONE ENCOUNTER
Daughter calling asking for refill for HYDROcodone-acetaminophen  MG Oral Tab. Okay to leave message on phone.  dmitry

## 2018-09-11 NOTE — PROGRESS NOTES
Cancer Center Progress Note    Patient Name: Monica Borjas   YOB: 1950   Medical Record Number: [id number]   Attending Physician: Betty Jim M.D.      Chief Complaint:  metastatic hepatocellular carcinoma    History of Present Illness:  70 Not on file    Occupational History      Not on file    Tobacco Use      Smoking status: Current Every Day Smoker        Packs/day: 0.20        Years: 45.00        Pack years: 9        Types: Cigarettes      Smokeless tobacco: Never Used      Tobacco comme supple. Lymphatics: There is no palpable peripheral lymphadenopathy   Chest: Symmetric expansion, nonlabored breathing  Cardiovascular: Regular with palpable distal pulses   Abdomen: Soft, non tender. Extremities: No edema. Neurological: 5/5 motor x4. active cancer directed therapy    The patient's emotional well being was assessed and resources were discussed. Appropriate resources were reviewed and discussed with the pateint and family.      Arsenio Rojas MD

## 2018-09-11 NOTE — PROGRESS NOTES
Pt to lab for pre-treatment blood draw. Pt offers no complaints today. PIV started to right wrist with good blood return. CBC, CMP, TSH drawn from PIV and sent to lab. Pt tolerated PIV start and blood draw.   PIV capped, saline locked and alcohol cap heriberto

## 2018-09-11 NOTE — PROGRESS NOTES
Pt here for Yulisa Mcmillan. Arrives Ambulating independently, accompanied by Family member   (daughter and grandson)    Modifications in dose or schedule: No    Infused Opdivo at 30 min rate per Package insert.    Frequency of blood return and site check thro

## 2018-09-25 NOTE — PROGRESS NOTES
Pt here for C8D1 Opdivo. Arrives Ambulating independently, accompanied by Family member           Modifications in dose or schedule: No.  Pt denies complaints today. MD appointment prior to infusion. Opdivo infused over 30 minutes per package insert.

## 2018-09-25 NOTE — PROGRESS NOTES
Cancer Center Progress Note    Patient Name: Jaelyn Ganies   YOB: 1950   Medical Record Number: [id number]   Attending Physician: Shay Negron M.D.      Chief Complaint:  metastatic hepatocellular carcinoma    History of Present Illness:  71 Socioeconomic History      Marital status:        Spouse name: Not on file      Number of children: Not on file      Years of education: Not on file      Highest education level: Not on file    Social Needs      Financial resource strain: Not on file 123/69 (BP Location: Left arm, Patient Position: Sitting)   Pulse 82   Temp 98.6 °F (37 °C) (Oral)   Resp 18   Ht 1.727 m (5' 8\")   Wt 72.1 kg (159 lb)   BMI 24.18 kg/m²     Physical Examination:  General: Patient is alert and oriented x 3, not in acute d level.  He will disease radiographically after cycle 7. We will continue with cycle 8.    –For poor appetite and unintentional weight loss will help enroll him in the medical cannabis program.  His weight has now improved  –for his perirectal abscess he is

## 2018-09-25 NOTE — PROGRESS NOTES
[facility] to lab today for same-day labs. He arrives alert and independent, his daughter is waiting in the lobby. He has an exam with Dr. Cortez Aguilera today, followed by possible Opdivo. 22g PIV started to right wrist with good blood return.  Labs collected per mendez

## 2018-10-09 NOTE — PROGRESS NOTES
Cancer Center Progress Note    Patient Name: Michelle Hampton   YOB: 1950   Medical Record Number: [id number]   Attending Physician: Mariella Hamlin M.D.      Chief Complaint:  metastatic hepatocellular carcinoma    History of Present Illness:  71 status:        Spouse name: Not on file      Number of children: Not on file      Years of education: Not on file      Highest education level: Not on file    Social Needs      Financial resource strain: Not on file      Food insecurity - worry: Not 5    Allergies:  No Known Allergies     Review of Systems:  All other systems reviewed and negative x12    Vital Signs:  /70 (BP Location: Left arm, Patient Position: Sitting)   Pulse 96   Temp 98.5 °F (36.9 °C) (Oral)   Resp 18   Ht 1.727 m (5' 8\") 2018  –After 4 cycles of nivolumab some slight progression however he has clinically improved with weight gain and increased energy level. He will disease radiographically after cycle 7. We will continue with cycle 9.    –For poor appetite and unintention

## 2018-10-09 NOTE — PROGRESS NOTES
Pt here for [address].   Arrives Ambulating independently, accompanied by Family member         Modifications in dose or schedule: No   Per notes from  \"–After 4 cycles of nivolumab some slight progression however he has clinically improved with w

## 2018-10-09 NOTE — PROGRESS NOTES
Pt to lab for pre-treatment blood draw. Pt offers no complaints today. PIV started to left FA with good blood return. CBC, CMP drawn from PIV and sent to lab. Pt tolerated PIV start and blood draw. PIV capped, saline locked and alcohol cap placed.   PIV

## 2018-10-23 NOTE — PROGRESS NOTES
Cancer Center Progress Note    Patient Name: Yovany Potter   YOB: 1950   Medical Record Number: [id number]   Attending Physician: Maco Flores M.D.      Chief Complaint:  metastatic hepatocellular carcinoma    History of Present Illness:  71 status:        Spouse name: Not on file      Number of children: Not on file      Years of education: Not on file      Highest education level: Not on file    Social Needs      Financial resource strain: Not on file      Food insecurity - worry: Not 5    Allergies:  No Known Allergies     Review of Systems:  All other systems reviewed and negative x12    Vital Signs:  /63 (BP Location: Left arm, Patient Position: Sitting)   Pulse 94   Temp 98.3 °F (36.8 °C) (Oral)   Resp 18   Ht 1.727 m (5' 8\") cycles of nivolumab some slight progression however he has clinically improved with weight gain and increased energy level. Stable disease radiographically after cycle 7. We will continue with cycle 10.    –For poor appetite and unintentional weight loss

## 2018-10-23 NOTE — PROGRESS NOTES
Pt here for C10 D1 Opdivo. Arrives Ambulating independently, accompanied by Self. Pt speaks Malay. Pt prefer [facility] for translation. Magdy Lewis, [facility] providing translation. Modifications in dose or schedule: No.  Pt denies complaints today.   Denies

## 2018-10-23 NOTE — PROGRESS NOTES
Pt to lab for pre-treatment blood draw. Pt offers no complaints today. PIV started to Right wrist with good blood return. CBC, CMP, TSH  drawn from PIV and sent to lab. Pt tolerated PIV start and blood draw.   PIV capped, saline locked and alcohol cap pl

## 2018-11-06 NOTE — PROGRESS NOTES
Pt here for C11 D1 Opdivo. Arrives Ambulating independently, accompanied by Self. Modifications in dose or schedule: No.  Pt denies complaints today. Denies fevers. MD appointment prior to infusion.     Opdivo infused over 30 minutes per pac

## 2018-11-06 NOTE — TELEPHONE ENCOUNTER
[facility] and order for cell count and culture. Sadia confirms and requests order for cell count and culture be placed on the order. She has also made note of it.    Call placed

## 2018-11-06 NOTE — PROGRESS NOTES
Cancer Center Progress Note    Patient Name: Ramandeep Cobian   YOB: 1950   Medical Record Number: [id number]   Attending Physician: Jason Kilgore M.D.      Chief Complaint:  metastatic hepatocellular carcinoma    History of Present Illness:  71 status:        Spouse name: Not on file      Number of children: Not on file      Years of education: Not on file      Highest education level: Not on file    Social Needs      Financial resource strain: Not on file      Food insecurity - worry: Not 5    Allergies:  No Known Allergies     Review of Systems:  All other systems reviewed and negative x12    Vital Signs:  BP (!) 146/104 (BP Location: Left arm, Patient Position: Sitting)   Pulse 112   Temp 97.7 °F (36.5 °C) (Oral)   Resp 18   Ht 1.727 m (5 June 2018  –After 4 cycles of nivolumab some slight progression however he has clinically improved with weight gain and increased energy level. Stable disease radiographically after cycle 7. We will continue with cycle 11. Repeat imaging after cycle 12.

## 2018-11-09 NOTE — PAT NURSING NOTE
Procedure Arrival Time: 1300    Arrival instructions:  Bring insurance card(s) and picture ID, Leave all valuables and jewelry at home, Wear appropriate clothing and Contacts or glasses as needed    Parking: All other: Park in blue lot, use Main entrance,

## 2018-11-11 NOTE — ED PROVIDER NOTES
Patient Seen in: [facility] AND [facility] Emergency Department    History   Patient presents with:  Abdomen/Flank Pain (GI/)    Stated Complaint: abd pain     HPI    The patient is a 21-year-old male with a history of atrial fibrillation, alcoholic cirrhosis src Oral   SpO2 100 %   O2 Device None (Room air)       Current:/86   Pulse 86   Temp 97.7 °F (36.5 °C) (Oral)   Resp 15   Ht 172.7 cm (5' 8\")   Wt 75.3 kg   SpO2 99%   BMI 25.24 kg/m²         Physical Exam   Constitutional: He is oriented to person components within normal limits   CBC W/ DIFFERENTIAL - Abnormal; Notable for the following components:    HGB 11.5 (*)     HCT 35.9 (*)     MCV 79.2 (*)     MCH 25.4 (*)     RDW 16.3 (*)     Neutrophil Absolute 8.1 (*)     Lymphocyte Absolute 0.5 (*) encounter diagnosis)  Hepatocellular carcinoma ([facility] [address]  Alcoholic cirrhosis of liver with ascites ([facility] [address]  Abdominal wall cellulitis    Disposition:  Discharge  11/11/2018 10:52 am    Follow-up:  Mellisa Benson MD  [address] [place] [address]

## 2018-11-11 NOTE — ED NOTES
Discharge home with daughter, discharge instructions given pt to follow up with Dr. Quentin Lutz and have paracentesis tomorrow as scheduled, patient/daughter understand need for taking antibiotic for abdomen redness and will go and get prescription filled, ambula

## 2018-11-11 NOTE — TELEPHONE ENCOUNTER
Patient was brought to the ER by wife with increasing abdominal distention. He is scheduled for paracentesis tomorrow. He was discharged home to have the procedure tomorrow.

## 2018-11-11 NOTE — ED INITIAL ASSESSMENT (HPI)
Pt arrives with daughter for abdominal pain,swelling  Reports hx of liver cancer scheduled for paracentesis tomorrow, last immunotherapy 11/6/18

## 2018-11-12 NOTE — IMAGING NOTE
PT TO ULTRASOUND ROOM  SCANS COMPLETED BY  US TECH     H/O LIVER CANCER                 PROCEDURE EXPLAINED QUESTIONS ANSWERED. PT CONSENTED AT  1403    PT TO GET ALBUMIN 25% 25 GRAM  NO      DR. WELSH   HERE, SCANNING COMPLETED.

## 2018-11-13 NOTE — TELEPHONE ENCOUNTER
Called Liz back. Updated her that Dr Sarah Boo said patient can stop the antibiotic. If diarrhea continues in the next 24 hours, a C-diff should be ordered.   I instructed Senthil Zhu to stop the antibiotic and monitor for continued diarrhea and she will call me reema

## 2018-11-13 NOTE — PROGRESS NOTES
SUBJECTIVE:  Nate Villagomez is a 70year old male with metastatic hepatocellular carcinoma who presents for a consultation at the request of, and a copy of this note will be sent to, Dr. Vinh Calderon, for evaluation of  benign prostatic hyperplasia and no of breath,  sputum production,chest pain or pleurisy. CARDIOVASCULAR:  Negative for pain or chest discomfort, dizziness or fainting, palpitations, leg swelling, nocturia, or claudication.   GASTROINTESTINAL:  Negative for nausea, vomiting, diarrhea, consti No prescriptions requested or ordered in this encounter       Imaging & Referrals:  None

## 2018-11-15 NOTE — TELEPHONE ENCOUNTER
Called Joce President to inquire to how her father's diarrhea is doing, she reports that it resolved once he stopped the antibiotic. No new complaints.   Encouraged her to call with any questions/ concerns, she verbalized understanding

## 2018-11-16 NOTE — TELEPHONE ENCOUNTER
Toxicities:  C11 D1 Nivolumab on 11/6/2018  Last f/u appt with Dr Chris Vega 11/6/2018     Fatigue/Anorexia/Heartburn/Bilateral Pedal Edema    Fatigue: Grade 2  Dyspnea: Grade 0(Liz reports Aiyana Arellano is not having dyspnea at rest or with exertion. No complaints of chest pressure/chest pain)  Anorexia: Grade 2(Liz reports her father is eating very small amounts of food. He will drink ensure. I asked her to please get the ensure with increased protein & have him drink 4-5 bottles a day if he is not eating. She agreed.)  Nausea: Grade 0(Denies. Bee Waldron reports for the last week Aiyana Arellano has been complaining of heartburn. She has noticed he is belching more.)  Vomiting: Grade 0(Denies)  Dehydration: Grade 1(Liz reports Aiyana Arellano is drinking. He denies feeling light headed or dizzy.)  Constipation: Grade 0(Liz reports Aiyana Arellano had a soft, brown, medium BM this morning. Denies hematochezia or melena. She reports Reggies abdomen is much smaller since his paracentesis on 11/12/2018. No complaints of abdominal pressure or pain.)  Diarrhea: Grade 0(Denies)  Peripheral Edema: Grade 0 (Liz reports Aiyana Arellano had some swelling on the top of his right foot since Sunday. She updated Poonam Ramirez RN earlier this week. Today both the tops of his feet are swollen. He can still get his shoes on. No redness/warmth or pain. He is elevating his legs when ever he sits or lays down. He is also avoiding salty foods. I encouraged Bee Waldron to push the protein enriched ensure.  She agreed to try.)      I told Bee Waldron I would update Sussy Chandlerma & Poonam Ramirez, RN

## 2018-11-16 NOTE — TELEPHONE ENCOUNTER
I spoke with Senia Pavon who is concerned with the swelling in Kunal's feet. It started yesterday with one foot. Today both feet are swollen. Senia Pavon can be reached at [phone].  Please Advise

## 2018-11-20 NOTE — PROGRESS NOTES
Cancer Center Progress Note    Patient Name: Roopa Roth   YOB: 1950   Medical Record Number: [id number]   Attending Physician: Jonathan Botello M.D.      Chief Complaint:  metastatic hepatocellular carcinoma    History of Present Illness:  70 education: Not on file      Highest education level: Not on file    Social Needs      Financial resource strain: Not on file      Food insecurity - worry: Not on file      Food insecurity - inability: Not on file      Transportation needs - medical: Not on Zolpidem Tartrate 5 MG Oral Tab, Take 1 tablet (5 mg total) by mouth nightly as needed for Sleep., Disp: 30 tablet, Rfl: 5    Allergies:  No Known Allergies     Review of Systems:  All other systems reviewed and negative x12    Vital Signs:  /90 (BP abscess status post drainage. –Started sorafenib 4/3/18.   Progression on initial CT 5/29/2018  –Started second line nivolumab in June 2018  –After 4 cycles of nivolumab some slight progression however he has clinically improved with weight gain and increa

## 2018-11-20 NOTE — PROGRESS NOTES
Pt here for C11 D1 Opdivo. Arrives Ambulating independently, accompanied by Self. Modifications in dose or schedule: No.  Pt reports decreased appetite and pruritis . Encourage smaller frequent meals. Family member will buy protein shakes.  He

## 2018-11-26 NOTE — IMAGING NOTE
PT TO ULTRASOUND ROOM  SCANS COMPLETED BY US TECH  RAJ    1340 HX TAKEN PROCEDURE EXPLAINED QUESTIONS ANSWERED. PT CONSENTED AT 1340    PT TO GET ALBUMIN 25% 25 GRAM YES      [address]   HERE, SCANNING COMPLETED.   PLATELETS =  553

## 2018-12-04 NOTE — PROGRESS NOTES
Cancer Center Progress Note    Patient Name: Sada Segovia   YOB: 1950   Medical Record Number: [id number]   Attending Physician: David Mckeon M.D.      Chief Complaint:  metastatic hepatocellular carcinoma    History of Present Illness: file      Number of children: Not on file      Years of education: Not on file      Highest education level: Not on file    Social Needs      Financial resource strain: Not on file      Food insecurity - worry: Not on file      Food insecurity - inability: Review of Systems:  All other systems reviewed and negative x12    Vital Signs:  /74 (BP Location: Left arm, Patient Position: Sitting, Cuff Size: adult)   Pulse 99   Temp 98.1 °F (36.7 °C) (Oral)   Resp 16   Ht 1.727 m (5' 8\")   Wt 76.3 kg (168 l abuse, being evaluated by Medical Oncology for metastatic hepatocellular carcinoma diagnosed in March 2018. He has metastatic adenopathy and adrenal metastasis. He has a history of a perirectal abscess status post drainage. –Started sorafenib 4/3/18.   P

## 2018-12-04 NOTE — PROGRESS NOTES
Pt here for C13 D1 Opdivo. Arrives Ambulating independently, accompanied by daughter. Daughter here to translate patient is primarily Luxembourger speaking. Refused language line prefers for daughter to translate.   PIV was placed in lab- IV patent with goo understanding  Comments:  Pt tolerated infusion. No s/s adverse reactions noted during or after infusion. PIV flushed and removed. Site covered with gauze and coban. Pt discharged stable and ambulatory. Aware of future appointments.

## 2018-12-04 NOTE — TELEPHONE ENCOUNTER
Notified Ector Sutherland in radiology of Dr Andrew Campbell order for paracentesis, she will contact pt's daughter to schedule

## 2018-12-10 NOTE — TELEPHONE ENCOUNTER
Spoke with Maggie Hayden. Her dad has been nauseated (no vomiting) and has had increased heartburn recently. I encouraged her to call her pharmacy as he should still have refills available on his compazine. Dr Angel Luis Montez also ordered protonix.   I instructed her to raquel

## 2018-12-10 NOTE — TELEPHONE ENCOUNTER
lenin calling father has been very nausea and heartburn. Denies fever, diaharrea, vomiting. He has not been eating but drinking ensure.  dmitry

## 2018-12-10 NOTE — IMAGING NOTE
PT TO ULTRASOUND ROOM  SCANS COMPLETED BY FERNANDO OCHOA US TECH    HX TAKEN PROCEDURE EXPLAINED QUESTIONS ANSWERED: HEPATO-CELLULAR CARCINOMA. PT CONSENTED AT [address] [place] [address]  HERE, SCANNING COMPLETED.       PLATELETS = 596(51/9/75)

## 2018-12-17 NOTE — PROGRESS NOTES
Cancer Center Progress Note    Patient Name: Kathy Villalta   YOB: 1950   Medical Record Number: [id number]   Attending Physician: Freedom Stewart M.D.      Chief Complaint:  metastatic hepatocellular carcinoma    History of Present Illness: file      Number of children: Not on file      Years of education: Not on file      Highest education level: Not on file    Social Needs      Financial resource strain: Not on file      Food insecurity - worry: Not on file      Food insecurity - inability: (5 mg total) by mouth nightly as needed for Sleep., Disp: 30 tablet, Rfl: 5    Allergies:  No Known Allergies     Review of Systems:  All other systems reviewed and negative x12    Vital Signs:  /72 (BP Location: Left arm, Patient Position: Sitting, 4/3/18. Progression on initial CT 5/29/2018  –Started second line nivolumab in June 2018  –After 4 cycles of nivolumab some slight progression however he has clinically improved with weight gain and increased energy level.   Stable disease radiographically

## 2018-12-18 NOTE — PROGRESS NOTES
Pt here for C14 D1 Opdivo. Arrives Ambulating independently, accompanied by daughter        Modifications in dose or schedule: No.  Pt reports decreased appetite and pruritis d/t ascites. Drinking ensure shakes. He is applying lotion daily for relief.

## 2018-12-18 NOTE — PROGRESS NOTES
41 Arianne Patient Status:  Medical Oncology Series    1950 MRN [id number]   [place] [address] Provider RAMOS Goldstein A his daughter, Kristen Sanchez translate today's visit for him. Amber Solano is A/O x 4 in no acute distress. Amber Solano defers to his daughter Kristen Sanchez for the answers to my questions. Patient appears overall comfortable on room air. He is pleasant and cooperative.  Overall, he s could use some help with his lower body dressing. Bladimir Cheatham says that Gaviota Mathias declines assistance from her. Patient's daughter reports that Gaviota Mathias spends most of his day sitting or lying down and reports increased fatigue.     Palliative Care Social History considered his health care surroge decision maker. Patient had confirmed wishes for DNR/DNI yesterday with Dr. Merrill Pyaton. Today, I provided education about what a DNR status entails and Mickie Good confirmed wishes for DNR/DNI.  POLST form completed today confir Systems:  Pertinent items are noted in HPI.     Hematology:  Lab Results   Component Value Date    WBC 9.5 12/17/2018    HGB 11.6 (L) 12/17/2018    HCT 36.0 (L) 12/17/2018     12/17/2018       Coags:  Lab Results   Component Value Date    INR 1.3 (H) (ascites)  : deferred  General color: normal  Neurologic: level of consciousness-alert  Orientation: x 4  Appearance: appropriately groomed  Affect: normal  Judgment: normal    Palliative Performance Scale : 50%    Palliative Care Goals of Care:  Patient coordinating care. Discussed today's visit with Dr. Nancie Garsia and RN Ester Bellamy. Thank you for allowing the Palliative Care Team to participate in the care of your patient. We will continue to follow.     Guillermo Reed NP-C, LINDA-BC, Jen, [phone]

## 2018-12-26 NOTE — TELEPHONE ENCOUNTER
Hood Hutson called to request for Jeanette Stammer a paracentesis, last one 12/10/18, he was hoping to hold off more than every 2 weeks but he is uncomfortable with pressure on abdomen and needs to be tapped again.   I told her that I would up date Melvina Luevano and Juan [phone] and some

## 2018-12-26 NOTE — TELEPHONE ENCOUNTER
Order placed by Tyrone STANFORD- called Vic Herndon in Radiology to contact patient/family to schedule for next available, contacted Chichi Rodriguez and let her know order was placed and that she should be getting a call to schedule procedure.

## 2018-12-31 NOTE — PROGRESS NOTES
Pt here for C15 D1 Opdivo. Arrives Ambulating independently, accompanied by daughter        Modifications in dose or schedule: No.    C/o fatigue. Abdomen very distended. Pt is due for paracentesis later today. Bilirubin of 1.7 noted. Contacted LENORE HAMM

## 2018-12-31 NOTE — PROGRESS NOTES
Cancer Center Progress Note    Patient Name: Kimberlyn Diggs   YOB: 1950   Medical Record Number: [id number]   Attending Physician: Candy Kennedy M.D.      Chief Complaint:  metastatic hepatocellular carcinoma    History of Present Illness: History      Marital status:        Spouse name: Not on file      Number of children: Not on file      Years of education: Not on file      Highest education level: Not on file    Social Needs      Financial resource strain: Not on file      Food ins tablet, Rfl: 2  •  Zolpidem Tartrate 5 MG Oral Tab, Take 1 tablet (5 mg total) by mouth nightly as needed for Sleep., Disp: 30 tablet, Rfl: 5    Allergies:  No Known Allergies     Review of Systems:  All other systems reviewed and negative x12    Vital Sig perirectal abscess status post drainage. –Started sorafenib 4/3/18.   Progression on initial CT 5/29/2018  –Started second line nivolumab in June 2018  –After 4 cycles of nivolumab some slight progression however he has clinically improved with weight gain

## 2018-12-31 NOTE — PROCEDURES
[facility] HOSP - [facility]  Procedure Note    Denver Luna Patient Status:  Outpatient    1950 MRN [id number]   Location [place] [address] Attending [facility] Peitro Jimenes MD     Procedure: U/S guided par

## 2019-01-01 ENCOUNTER — TELEPHONE (OUTPATIENT)
Dept: HEMATOLOGY/ONCOLOGY | Facility: HOSPITAL | Age: 69
End: 2019-01-01

## 2019-01-01 ENCOUNTER — OFFICE VISIT (OUTPATIENT)
Dept: HEMATOLOGY/ONCOLOGY | Facility: HOSPITAL | Age: 69
End: 2019-01-01
Attending: INTERNAL MEDICINE
Payer: MEDICAID

## 2019-01-01 VITALS
RESPIRATION RATE: 18 BRPM | HEIGHT: 68 IN | WEIGHT: 163.13 LBS | DIASTOLIC BLOOD PRESSURE: 76 MMHG | SYSTOLIC BLOOD PRESSURE: 123 MMHG | HEART RATE: 110 BPM | BODY MASS INDEX: 24.72 KG/M2 | TEMPERATURE: 98 F

## 2019-01-01 DIAGNOSIS — R63.4 UNEXPLAINED WEIGHT LOSS: ICD-10-CM

## 2019-01-01 DIAGNOSIS — C22.0 HEPATOCELLULAR CARCINOMA (HCC): Primary | ICD-10-CM

## 2019-01-01 DIAGNOSIS — Z71.89 GOALS OF CARE, COUNSELING/DISCUSSION: ICD-10-CM

## 2019-01-01 DIAGNOSIS — K70.30 ALCOHOLIC CIRRHOSIS OF LIVER WITHOUT ASCITES (HCC): ICD-10-CM

## 2019-01-01 LAB
ALBUMIN SERPL BCP-MCNC: 2.2 G/DL (ref 3.5–4.8)
ALBUMIN/GLOB SERPL: 0.5 {RATIO} (ref 1–2)
ALP SERPL-CCNC: 551 U/L (ref 32–100)
ALT SERPL-CCNC: 33 U/L (ref 17–63)
ANION GAP SERPL CALC-SCNC: 10 MMOL/L (ref 0–18)
AST SERPL-CCNC: 88 U/L (ref 15–41)
BASOPHILS # BLD: 0 K/UL (ref 0–0.2)
BASOPHILS NFR BLD: 0 %
BILIRUB SERPL-MCNC: 1.7 MG/DL (ref 0.3–1.2)
BUN SERPL-MCNC: 23 MG/DL (ref 8–20)
BUN/CREAT SERPL: 17.7 (ref 10–20)
CALCIUM SERPL-MCNC: 8.4 MG/DL (ref 8.5–10.5)
CHLORIDE SERPL-SCNC: 104 MMOL/L (ref 95–110)
CO2 SERPL-SCNC: 18 MMOL/L (ref 22–32)
CREAT SERPL-MCNC: 1.3 MG/DL (ref 0.5–1.5)
EOSINOPHIL # BLD: 0.3 K/UL (ref 0–0.7)
EOSINOPHIL NFR BLD: 4 %
ERYTHROCYTE [DISTWIDTH] IN BLOOD BY AUTOMATED COUNT: 19.7 % (ref 11–15)
GLOBULIN PLAS-MCNC: 4.1 G/DL (ref 2.5–3.7)
GLUCOSE SERPL-MCNC: 84 MG/DL (ref 70–99)
HCT VFR BLD AUTO: 35.7 % (ref 41–52)
HGB BLD-MCNC: 11.6 G/DL (ref 13.5–17.5)
LYMPHOCYTES # BLD: 1 K/UL (ref 1–4)
LYMPHOCYTES NFR BLD: 13 %
MCH RBC QN AUTO: 26.4 PG (ref 27–32)
MCHC RBC AUTO-ENTMCNC: 32.5 G/DL (ref 32–37)
MCV RBC AUTO: 81.2 FL (ref 80–100)
MONOCYTES # BLD: 0.8 K/UL (ref 0–1)
MONOCYTES NFR BLD: 11 %
NEUTROPHILS # BLD AUTO: 5.3 K/UL (ref 1.8–7.7)
NEUTROPHILS NFR BLD: 72 %
OSMOLALITY UR CALC.SUM OF ELEC: 277 MOSM/KG (ref 275–295)
PATIENT FASTING: NO
PLATELET # BLD AUTO: 299 K/UL (ref 140–400)
PMV BLD AUTO: 8 FL (ref 7.4–10.3)
POTASSIUM SERPL-SCNC: 4.5 MMOL/L (ref 3.3–5.1)
PROT SERPL-MCNC: 6.3 G/DL (ref 5.9–8.4)
RBC # BLD AUTO: 4.39 M/UL (ref 4.5–5.9)
SODIUM SERPL-SCNC: 132 MMOL/L (ref 136–144)
T3 SERPL-MCNC: 1.19 NG/ML (ref 0.87–1.78)
T4 FREE SERPL-MCNC: 1.07 NG/DL (ref 0.58–1.64)
TSH SERPL-ACNC: 11.35 UIU/ML (ref 0.45–5.33)
WBC # BLD AUTO: 7.4 K/UL (ref 4–11)

## 2019-01-01 PROCEDURE — 84439 ASSAY OF FREE THYROXINE: CPT

## 2019-01-01 PROCEDURE — 84480 ASSAY TRIIODOTHYRONINE (T3): CPT

## 2019-01-01 PROCEDURE — 36415 COLL VENOUS BLD VENIPUNCTURE: CPT

## 2019-01-01 PROCEDURE — 80053 COMPREHEN METABOLIC PANEL: CPT

## 2019-01-01 PROCEDURE — 85025 COMPLETE CBC W/AUTO DIFF WBC: CPT

## 2019-01-01 PROCEDURE — 84443 ASSAY THYROID STIM HORMONE: CPT

## 2019-01-01 PROCEDURE — 99215 OFFICE O/P EST HI 40 MIN: CPT | Performed by: INTERNAL MEDICINE

## 2019-01-11 NOTE — PROGRESS NOTES
Cancer Center Progress Note    Patient Name: Reyna Coleman   YOB: 1950   Medical Record Number: [id number]   Attending Physician: Krzysztof Arceo M.D.      Chief Complaint:  metastatic hepatocellular carcinoma    History of Present Illness: Years of education: Not on file      Highest education level: Not on file    Social Needs      Financial resource strain: Not on file      Food insecurity - worry: Not on file      Food insecurity - inability: Not on file      Transportation needs - [facility]ica Sleep., Disp: 30 tablet, Rfl: 5    Allergies:  No Known Allergies     Review of Systems:  All other systems reviewed and negative x12    Vital Signs:  /76 (BP Location: Left arm, Patient Position: Sitting)   Pulse 110   Temp 98.1 °F (36.7 °C) (Oral) 5/29/2018  –Started second line nivolumab in June 2018  –After 4 cycles of nivolumab some slight progression however he has clinically improved with weight gain and increased energy level. Stable disease radiographically after cycle 7.   Mixed response aga

## 2019-01-11 NOTE — TELEPHONE ENCOUNTER
Called daughter back, she updated me that hospice has an appt to meet patient tomorrow. Answered questions about hospice as able, dtr very tearful on phone, emotional support given. Daughter states \"what if we decide not go with hospice? \"  Asked her to

## 2019-01-11 NOTE — TELEPHONE ENCOUNTER
Orders and notes faxed to [facility] at [phone].   With request to contact daughter Gris Sweeney at [phone]

## 2019-01-11 NOTE — TELEPHONE ENCOUNTER
Returning Laneia call, requesting call back regarding labs and possible tx on Monday,  Hospice is visiting on Saturday.

## 2023-02-09 NOTE — PLAN OF CARE
ANXIETY    • Will report anxiety at manageable levels Progressing        CARDIOVASCULAR - ADULT    • Maintains optimal cardiac output and hemodynamic stability Progressing    • Absence of cardiac arrhythmias or at baseline Progressing        COPING    • Pt Is This A New Presentation, Or A Follow-Up?: Skin Lesions

## 2024-02-21 NOTE — TELEPHONE ENCOUNTER
Asking if pt still needs to keep f/u appt on Monday with PCP - Dr Erwin Rule re his recent liver US - or should they cancel appt
Contacted pt daughter, Jesus Farmer, and reviewed that they should keep their appt with Taylor Marcelo on 3/12/18 @ 12:15PM as already scheduled. She requested the results of CT a/p to be faxed to Dr. Guillermo Simmons office.  I reviewed that once the results are available and
Discussed case with PB. CT a/p report faxed to Dr. Janna Gavin office at [phone]. Fax confirmation received 3/12/18 @ [phone].
I tried to call the patient this AM, however there was no response x 2. Unable to leave a message to call back as the phone rings and then disconnects - no VM set up. Please call me when the daughter calls back. Will OK sending to PCP after review.
Multiple discussions this morning with patient's daughter - we discussed the CT at length. There are quite a few variables here and after discussion with on-call Dr. Kiet Washburn, consideration for ER evaluation is recommended.     - Consult with JUDY   - Aura Dixon
PB-    Please advise on pt message below as the pt is scheduled for CT a/p on 3/9/18, thank you.
PB-    Pt has appt today with PCP at 12:15PM and daughter, Jose Miguel Vickers, wanted copy of CT a/p report to be faxed to his office prior to their appt. Please advise on results so I can inform her and fax report to PCP, thank you.
Pts daughter Jose Miguel Vickers called to see if CT results are available yet. Please call.
They should keep their follow up. Thank you, Iglesia Coppola.
Render In Strict Bullet Format?: No
Detail Level: Zone
Initiate Treatment: Jublia 10 % topical solution with applicator \\nQuantity: 4.0 ml  Days Supply: 30\\nSig: Apply to affected nails every night\\nSent to [facility]phony

## 2024-07-19 NOTE — IMAGING NOTE
Pt to ultrasound room 3 at 1150.  scans taken by Tuyet Allen. Hx reviewed; informed consent signed at 18. Pt to get albumin 25% 25 grams? Yes      Dr. Linda Villarreal at bedside at [phone]. Scanning completed at 1227.   PLATELETS = 630   PT= 15.1
Resident/Fellow

## (undated) DEVICE — ENDOSCOPY PACK - LOWER: Brand: MEDLINE INDUSTRIES, INC.

## (undated) DEVICE — SUTURE SILK 2-0 SH

## (undated) DEVICE — STANDARD HYPODERMIC NEEDLE,ALUMINUM HUB: Brand: MONOJECT

## (undated) DEVICE — DRAPE SHEET LAPAROTOMY

## (undated) DEVICE — 60 ML SYRINGE CATHETER TIP: Brand: MONOJECT

## (undated) DEVICE — CULTURE COLLECT/TRANSPORT SYS

## (undated) DEVICE — JELLY LUBRICATING 3 GRAM PACKET STERILE SINGLE-PATIENT USE NOT MADE WITH NATURAL RUBBER LATEX MEDICHOICE: Brand: MEDICHOICE

## (undated) DEVICE — Device: Brand: DEFENDO AIR/WATER/SUCTION AND BIOPSY VALVE

## (undated) DEVICE — CULTURE TUBE ANAEROBIC

## (undated) DEVICE — MINOR GENERAL: Brand: MEDLINE INDUSTRIES, INC.

## (undated) DEVICE — SOL IRRIG NACL 1000CC BTL .9%

## (undated) DEVICE — FAN SPRAY KIT: Brand: PULSAVAC®

## (undated) DEVICE — AUTOCLAVABLE TRANSLUCENT LIGHT GUIDE, 5 MM X 3M: Brand: CONMED

## (undated) DEVICE — REM POLYHESIVE INFANT PATIENT RETURN ELECTRODE: Brand: VALLEYLAB

## (undated) DEVICE — STERILE LATEX POWDER-FREE SURGICAL GLOVESWITH NITRILE COATING: Brand: PROTEXIS

## (undated) DEVICE — SOL  .9 3000ML

## (undated) DEVICE — GOWN SURG AERO BLUE PERF XLG

## (undated) DEVICE — MATERNITY KNIT PANTS,SEAMLESS: Brand: WINGS

## (undated) DEVICE — BANDAGE ROLL,100% COTTON, 6 PLY, LARGE: Brand: KERLIX

## (undated) DEVICE — DRAIN PENROSE 12X1/4

## (undated) DEVICE — GAUZE PACKING IODOFORM 1/2X5

## (undated) NOTE — LETTER
Hospital Discharge Documentation  Please phone to schedule a hospital follow up appointment.     From: [address] Hospitalist's Office  Phone: [phone]    Patient discharged time/date: 3/16/2018  5:57 PM  Patient discharge disposition:  Home or Self for evaluation. CBC and chemistry were unremarkable. Blood cultures were obtained, started on IV Zosyn.   He will be admitted to hospital for further management.       DISCHARGE DX:Liver Mass  M/l Metastatic Hepatocellular carcinoma, AFP elevated and eval carcinoma. Additional potential exophytic caudate lobe mass versus marked barby hepatis lymphadenopathy that measures up to  8.1 cm. Smaller barby hepatis and upper mesenteric lymph nodes are also apparent and likely metastatic.  AFP correlation and dedicat incidental findings as above. Report faxed to ordering physician office. Mri Abdomen Liver(w+wo Iv) With Volume(cpt=74183)    Result Date: 3/13/2018  CONCLUSION:   1. 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  The largest hepatic m Where to Get Your Medications      Please  your prescriptions at the location directed by your doctor or nurse    Bring a paper prescription for each of these medications  · metRONIDAZOLE 500 MG Tabs  · Sulfamethoxazole-TMP -160 MG Tabs per ta

## (undated) NOTE — LETTER
[address], [place]  Authorization for Surgical Operation or Procedure    1.  I hereby authorize RAMOS Dumas, my physician and the assistant, to perform the following operation and/or procedure:  Ultrasound-Guided Parace 5. I consent to the photographing of the operations or procedures to be performed for the purposes of advancing medicine, science, and/or education, provided my identity is not revealed.  If the procedure has been videotaped, the physician/surgeon will obta risks and benefits involved in the proposed treatment and any reasonable alternative to the proposed treatment. I have also explained the risks and benefits involved in the refusal of the proposed treatment and have answered the patient's questions.  If I h

## (undated) NOTE — LETTER
[address], [place]  Authorization for Surgical Operation or Procedure    1.  I hereby authorize RAMOS Dorantes , my physician and the assistant, to perform the following operation and/or procedure:  Ultrasound-Guided Parac 5. I consent to the photographing of the operations or procedures to be performed for the purposes of advancing medicine, science, and/or education, provided my identity is not revealed.  If the procedure has been videotaped, the physician/surgeon will obta risks and benefits involved in the proposed treatment and any reasonable alternative to the proposed treatment. I have also explained the risks and benefits involved in the refusal of the proposed treatment and have answered the patient's questions.  If I h

## (undated) NOTE — LETTER
[address] [place] [address] Rd, [place], [place]     AUTHORIZATION FOR SURGICAL OPERATION OR PROCEDURE    I hereby authorize Dr. Horacio Mixon / Raymundo Reece, my Physician(s) and whomever may be designated as the doctor's Assistant, to perform the f 4. I consent to the photographing of procedure(s) to be performed for the purposes of advancing medicine, science and/or education, provided my identity is not revealed.  If the procedure has been videotaped, the physician/surgeon will obtain the original v (Witness signature)                                                                                                  (Date)                                (Time)  STATEMENT OF PHYSICIAN My signature below affirms that prior to the time of the procedure;  I

## (undated) NOTE — LETTER
[address] [place] [address]  Authorization for Surgical Operation or Procedure    1.  I hereby authorize Dr. Sumi Escobar, my physician and the assistant, to perform the following operation and/or procedure:  Ultrasound-Guided 5. I consent to the photographing of the operations or procedures to be performed for the purposes of advancing medicine, science, and/or education, provided my identity is not revealed.  If the procedure has been videotaped, the physician/surgeon will obta risks and benefits involved in the proposed treatment and any reasonable alternative to the proposed treatment. I have also explained the risks and benefits involved in the refusal of the proposed treatment and have answered the patient's questions.  If I h

## (undated) NOTE — ED AVS SNAPSHOT
Chelsi Jordan   MRN: [id number]    Department:  [facility] Emergency Department   Date of Visit:  11/11/2018           Disclosure     Insurance plans vary and the physician(s) referred by the ER may not be covered by your plan.  Please contact within the next three months to obtain basic health screening including reassessment of your blood pressure.     IF THERE IS ANY CHANGE OR WORSENING OF YOUR CONDITION, CALL YOUR PRIMARY CARE PHYSICIAN AT ONCE OR RETURN IMMEDIATELY TO THE EMERGENCY DEPARTMEN

## (undated) NOTE — LETTER
[address], [place]  Authorization for Surgical Operation or Procedure    1.  I hereby authorize RAMOS Ponce, my physician and the assistant, to perform the following operation and/or procedure:  Ultrasound-Guided Parace 5. I consent to the photographing of the operations or procedures to be performed for the purposes of advancing medicine, science, and/or education, provided my identity is not revealed.  If the procedure has been videotaped, the physician/surgeon will obta risks and benefits involved in the proposed treatment and any reasonable alternative to the proposed treatment. I have also explained the risks and benefits involved in the refusal of the proposed treatment and have answered the patient's questions.  If I h

## (undated) NOTE — IP AVS SNAPSHOT
[address]  [address] [facility] [phone]                Discharge Summary   5/20/2017    [facility]           Admission Information        Provider Department    5/20/2017 Logan Hernandez MD, Coy Du MD Em Follow up with Venus Ludwig MD In 1 week. Specialty:  SURGERY, GENERAL    Contact information:    [address] [facility]  [id number]  [address]  [phone]        Immunization History as of 5/23/2017  Never Reviewed    No immunizations on file. Pending Labs     Order Current Status    ANAEROBIC CULTURE In process      Radiology Exams     None         Additional Information       We are concerned for your overall well being:    - If you are a smoker or have smoked in the last 12 months, we encoura prescribed to take and their potential SIDE EFFECTS. Your nurse will review your medications with you before you are discharged, and can provide you with additional printed information.  Not all patients will experience these side effects or respond to [facility]